# Patient Record
Sex: FEMALE | Race: BLACK OR AFRICAN AMERICAN | Employment: FULL TIME | URBAN - METROPOLITAN AREA
[De-identification: names, ages, dates, MRNs, and addresses within clinical notes are randomized per-mention and may not be internally consistent; named-entity substitution may affect disease eponyms.]

---

## 2017-01-02 ENCOUNTER — APPOINTMENT (OUTPATIENT)
Dept: CT IMAGING | Age: 22
End: 2017-01-02
Attending: EMERGENCY MEDICINE
Payer: COMMERCIAL

## 2017-01-02 ENCOUNTER — HOSPITAL ENCOUNTER (EMERGENCY)
Age: 22
Discharge: HOME | End: 2017-01-03
Attending: EMERGENCY MEDICINE
Payer: COMMERCIAL

## 2017-01-02 DIAGNOSIS — M79.602 LEFT ARM PAIN: Primary | ICD-10-CM

## 2017-01-02 LAB
ANION GAP BLD CALC-SCNC: 6 MMOL/L (ref 3–18)
BASOPHILS # BLD AUTO: 0.1 K/UL (ref 0–0.1)
BASOPHILS # BLD: 1 % (ref 0–2)
BUN SERPL-MCNC: 9 MG/DL (ref 7–18)
BUN/CREAT SERPL: 11 (ref 12–20)
CALCIUM SERPL-MCNC: 8.9 MG/DL (ref 8.5–10.1)
CHLORIDE SERPL-SCNC: 104 MMOL/L (ref 100–108)
CK MB CFR SERPL CALC: 0.6 % (ref 0–4)
CK MB SERPL-MCNC: 0.7 NG/ML (ref 0.5–3.6)
CK SERPL-CCNC: 108 U/L (ref 26–192)
CO2 SERPL-SCNC: 28 MMOL/L (ref 21–32)
CREAT SERPL-MCNC: 0.83 MG/DL (ref 0.6–1.3)
DIFFERENTIAL METHOD BLD: ABNORMAL
EOSINOPHIL # BLD: 0.5 K/UL (ref 0–0.4)
EOSINOPHIL NFR BLD: 6 % (ref 0–5)
ERYTHROCYTE [DISTWIDTH] IN BLOOD BY AUTOMATED COUNT: 14 % (ref 11.6–14.5)
GLUCOSE SERPL-MCNC: 84 MG/DL (ref 74–99)
HCT VFR BLD AUTO: 37.5 % (ref 35–45)
HGB BLD-MCNC: 11.9 G/DL (ref 12–16)
LYMPHOCYTES # BLD AUTO: 35 % (ref 21–52)
LYMPHOCYTES # BLD: 2.8 K/UL (ref 0.9–3.6)
MCH RBC QN AUTO: 25.5 PG (ref 24–34)
MCHC RBC AUTO-ENTMCNC: 31.7 G/DL (ref 31–37)
MCV RBC AUTO: 80.3 FL (ref 74–97)
MONOCYTES # BLD: 0.6 K/UL (ref 0.05–1.2)
MONOCYTES NFR BLD AUTO: 8 % (ref 3–10)
NEUTS SEG # BLD: 4.2 K/UL (ref 1.8–8)
NEUTS SEG NFR BLD AUTO: 50 % (ref 40–73)
PLATELET # BLD AUTO: 315 K/UL (ref 135–420)
PMV BLD AUTO: 9.6 FL (ref 9.2–11.8)
POTASSIUM SERPL-SCNC: 3.7 MMOL/L (ref 3.5–5.5)
RBC # BLD AUTO: 4.67 M/UL (ref 4.2–5.3)
SODIUM SERPL-SCNC: 138 MMOL/L (ref 136–145)
TROPONIN I BLD-MCNC: <0.04 NG/ML (ref 0–0.08)
TROPONIN I SERPL-MCNC: <0.02 NG/ML (ref 0–0.04)
WBC # BLD AUTO: 8.2 K/UL (ref 4.6–13.2)

## 2017-01-02 PROCEDURE — 70450 CT HEAD/BRAIN W/O DYE: CPT

## 2017-01-02 PROCEDURE — 84484 ASSAY OF TROPONIN QUANT: CPT | Performed by: EMERGENCY MEDICINE

## 2017-01-02 PROCEDURE — 99285 EMERGENCY DEPT VISIT HI MDM: CPT

## 2017-01-02 PROCEDURE — 93005 ELECTROCARDIOGRAM TRACING: CPT

## 2017-01-02 PROCEDURE — 85025 COMPLETE CBC W/AUTO DIFF WBC: CPT | Performed by: EMERGENCY MEDICINE

## 2017-01-02 PROCEDURE — 80048 BASIC METABOLIC PNL TOTAL CA: CPT | Performed by: EMERGENCY MEDICINE

## 2017-01-03 VITALS
TEMPERATURE: 98 F | SYSTOLIC BLOOD PRESSURE: 108 MMHG | DIASTOLIC BLOOD PRESSURE: 66 MMHG | HEART RATE: 68 BPM | RESPIRATION RATE: 16 BRPM | OXYGEN SATURATION: 99 %

## 2017-01-03 LAB
ATRIAL RATE: 65 BPM
ATRIAL RATE: 68 BPM
CALCULATED P AXIS, ECG09: -2 DEGREES
CALCULATED P AXIS, ECG09: 12 DEGREES
CALCULATED R AXIS, ECG10: 29 DEGREES
CALCULATED R AXIS, ECG10: 42 DEGREES
CALCULATED T AXIS, ECG11: 24 DEGREES
CALCULATED T AXIS, ECG11: 35 DEGREES
DIAGNOSIS, 93000: NORMAL
DIAGNOSIS, 93000: NORMAL
P-R INTERVAL, ECG05: 144 MS
P-R INTERVAL, ECG05: 152 MS
Q-T INTERVAL, ECG07: 398 MS
Q-T INTERVAL, ECG07: 410 MS
QRS DURATION, ECG06: 80 MS
QRS DURATION, ECG06: 80 MS
QTC CALCULATION (BEZET), ECG08: 423 MS
QTC CALCULATION (BEZET), ECG08: 426 MS
VENTRICULAR RATE, ECG03: 65 BPM
VENTRICULAR RATE, ECG03: 68 BPM

## 2017-01-03 NOTE — ED PROVIDER NOTES
HPI Comments: 7:37 PM female 22 y.o. with a history of migranes presenting to the ED complaining of lower left sided chest pain x 1 hour. She states the pain intially began as numbness in her left index finger and thumb at 1530. She states that the pain did not subside and radiated ot her left elbow, shoulder, lower left chest and back. She further reports light headedness. She notes that her MP onset yesterday and was unusually heavy, notes her LMP was also unusually heavy. She has not been evaluated by her OB/GYN since menorrhagia onset. Also notes she is currently on birth control but no other medications. She is a  at a beauty supply store x 1 year. Pt denies the use of tobacco, EtOH or illicit drugs. Denies any known allergies. She also denies HA, NVD. No other associated symptoms or other concerns at this time. PCP: Carmine Soliman MD      Patient is a 22 y.o. female presenting with arm pain and chest pain. The history is provided by the patient. Arm Pain    Associated symptoms include back pain. Pertinent negatives include no numbness and no neck pain. Chest Pain (Angina)    Associated symptoms include back pain. Pertinent negatives include no abdominal pain, no cough, no dizziness, no headaches, no nausea, no numbness, no shortness of breath, no vomiting and no weakness. Past Medical History:   Diagnosis Date    Migraines        History reviewed. No pertinent past surgical history. History reviewed. No pertinent family history. Social History     Social History    Marital status: SINGLE     Spouse name: N/A    Number of children: N/A    Years of education: N/A     Occupational History    Not on file.      Social History Main Topics    Smoking status: Never Smoker    Smokeless tobacco: Not on file    Alcohol use No    Drug use: Not on file    Sexual activity: Not on file     Other Topics Concern    Not on file     Social History Narrative         ALLERGIES: Mushroom combination no.1    Review of Systems   Constitutional: Negative. Negative for activity change and appetite change. HENT: Negative for congestion, ear discharge, ear pain, facial swelling, nosebleeds, postnasal drip, sinus pressure, sneezing and tinnitus. Eyes: Negative for pain, discharge, redness and visual disturbance. Respiratory: Negative for apnea, cough, choking, chest tightness, shortness of breath, wheezing and stridor. Cardiovascular: Positive for chest pain. Negative for leg swelling. Gastrointestinal: Negative for abdominal distention, abdominal pain, anal bleeding, blood in stool, constipation, diarrhea, nausea and vomiting. Genitourinary: Positive for menstrual problem. Negative for decreased urine volume, difficulty urinating, dyspareunia, dysuria, flank pain, frequency, hematuria, pelvic pain, urgency, vaginal bleeding and vaginal discharge. Musculoskeletal: Positive for back pain and myalgias. Negative for arthralgias, gait problem, joint swelling, neck pain and neck stiffness. Skin: Negative for color change. Neurological: Positive for light-headedness. Negative for dizziness, tremors, seizures, speech difficulty, weakness, numbness and headaches. Hematological: Negative for adenopathy. Does not bruise/bleed easily. Psychiatric/Behavioral: Negative for agitation, dysphoric mood and self-injury. The patient is not nervous/anxious. Vitals:    01/02/17 2145 01/02/17 2215 01/02/17 2230 01/02/17 2300   BP: 102/62 100/56 104/58 102/62   Pulse: 71 70 69 69   Resp: 12 19 15 12   Temp:       SpO2: 100% 100% 100% 100%            Physical Exam   Constitutional: She is oriented to person, place, and time. She appears well-developed and well-nourished. HENT:   Head: Normocephalic and atraumatic.    Right Ear: External ear normal.   Left Ear: External ear normal.   Nose: Nose normal.   Mouth/Throat: Oropharynx is clear and moist.   Eyes: Conjunctivae and EOM are normal. Pupils are equal, round, and reactive to light. Neck: Normal range of motion. Neck supple. Cardiovascular: Regular rhythm, normal heart sounds and intact distal pulses. Pulmonary/Chest: Effort normal and breath sounds normal. No respiratory distress. She has no wheezes. She has no rales. She exhibits no tenderness. Abdominal: Soft. Bowel sounds are normal. She exhibits no distension and no mass. There is no tenderness. There is no rebound and no guarding. Musculoskeletal: Normal range of motion. She exhibits no edema. Neurological: She is alert and oriented to person, place, and time. Skin: Skin is warm and dry. No rash noted. No erythema. Psychiatric: She has a normal mood and affect. Her behavior is normal. Judgment normal.   Nursing note and vitals reviewed. MDM  Number of Diagnoses or Management Options  Left arm pain:   Diagnosis management comments: Patient with numbness of the left index finger and thumb The differential diagnosis includes but is not limited to neuropathy, cervical injury, cardiac disease, IC lesion, other.          Amount and/or Complexity of Data Reviewed  Clinical lab tests: ordered and reviewed  Tests in the radiology section of CPT®: ordered and reviewed    Risk of Complications, Morbidity, and/or Mortality  Presenting problems: moderate    Patient Progress  Patient progress: improved    ED Course       Procedures      Vitals:  Patient Vitals for the past 12 hrs:   Temp Pulse Resp BP SpO2   01/02/17 2300 - 69 12 102/62 100 %   01/02/17 2230 - 69 15 104/58 100 %   01/02/17 2215 - 70 19 100/56 100 %   01/02/17 2145 - 71 12 102/62 100 %   01/02/17 2115 - - - 95/59 100 %   01/02/17 1925 97.9 °F (36.6 °C) 69 16 119/77 100 %         Medications ordered:   Medications - No data to display      Lab findings:  Recent Results (from the past 12 hour(s))   EKG, 12 LEAD, INITIAL    Collection Time: 01/02/17  7:16 PM   Result Value Ref Range    Ventricular Rate 68 BPM    Atrial Rate 68 BPM    P-R Interval 152 ms    QRS Duration 80 ms    Q-T Interval 398 ms    QTC Calculation (Bezet) 423 ms    Calculated P Axis 12 degrees    Calculated R Axis 29 degrees    Calculated T Axis 24 degrees    Diagnosis       Normal sinus rhythm  Normal ECG  No previous ECGs available     CBC WITH AUTOMATED DIFF    Collection Time: 01/02/17  8:00 PM   Result Value Ref Range    WBC 8.2 4.6 - 13.2 K/uL    RBC 4.67 4.20 - 5.30 M/uL    HGB 11.9 (L) 12.0 - 16.0 g/dL    HCT 37.5 35.0 - 45.0 %    MCV 80.3 74.0 - 97.0 FL    MCH 25.5 24.0 - 34.0 PG    MCHC 31.7 31.0 - 37.0 g/dL    RDW 14.0 11.6 - 14.5 %    PLATELET 860 864 - 285 K/uL    MPV 9.6 9.2 - 11.8 FL    NEUTROPHILS 50 40 - 73 %    LYMPHOCYTES 35 21 - 52 %    MONOCYTES 8 3 - 10 %    EOSINOPHILS 6 (H) 0 - 5 %    BASOPHILS 1 0 - 2 %    ABS. NEUTROPHILS 4.2 1.8 - 8.0 K/UL    ABS. LYMPHOCYTES 2.8 0.9 - 3.6 K/UL    ABS. MONOCYTES 0.6 0.05 - 1.2 K/UL    ABS. EOSINOPHILS 0.5 (H) 0.0 - 0.4 K/UL    ABS.  BASOPHILS 0.1 0.0 - 0.1 K/UL    DF AUTOMATED     METABOLIC PANEL, BASIC    Collection Time: 01/02/17  8:00 PM   Result Value Ref Range    Sodium 138 136 - 145 mmol/L    Potassium 3.7 3.5 - 5.5 mmol/L    Chloride 104 100 - 108 mmol/L    CO2 28 21 - 32 mmol/L    Anion gap 6 3.0 - 18 mmol/L    Glucose 84 74 - 99 mg/dL    BUN 9 7.0 - 18 MG/DL    Creatinine 0.83 0.6 - 1.3 MG/DL    BUN/Creatinine ratio 11 (L) 12 - 20      GFR est AA >60 >60 ml/min/1.73m2    GFR est non-AA >60 >60 ml/min/1.73m2    Calcium 8.9 8.5 - 10.1 MG/DL   CARDIAC PANEL,(CK, CKMB & TROPONIN)    Collection Time: 01/02/17  8:00 PM   Result Value Ref Range     26 - 192 U/L    CK - MB 0.7 0.5 - 3.6 ng/ml    CK-MB Index 0.6 0.0 - 4.0 %    Troponin-I, Qt. <0.02 0.0 - 0.045 NG/ML   EKG, 12 LEAD, SUBSEQUENT    Collection Time: 01/02/17  9:13 PM   Result Value Ref Range    Ventricular Rate 65 BPM    Atrial Rate 65 BPM    P-R Interval 144 ms    QRS Duration 80 ms    Q-T Interval 410 ms    QTC Calculation (Bezet) 426 ms    Calculated P Axis -2 degrees    Calculated R Axis 42 degrees    Calculated T Axis 35 degrees    Diagnosis       Normal sinus rhythm  Normal ECG  When compared with ECG of 02-JAN-2017 19:16,  No significant change was found     POC TROPONIN-I    Collection Time: 01/02/17 11:25 PM   Result Value Ref Range    Troponin-I (POC) <0.04 0.00 - 0.08 ng/mL         X-Ray, CT or other radiology findings or impressions:  CT HEAD WO CONT   Final Result   IMPRESSION:     No acute intracranial pathology. The brain looks normal.       Progress notes, Consult notes or additional Procedure notes:     11:42 PM: Patient labs and radiology is normal. Suspect that this is a pinched nerve vs demyelinated lesion. Reassessed patient and patient is feeling well. Discussed their results and diagnosis. Patient will be discharged in stable condition. I suggested to the patient to receive follow up studies to determine etiology. Patient is to return to the emergency department for any new or worsening conditions. Patient understands and agrees with discharge plan. Disposition:  Diagnosis:   1. Left arm pain        Disposition: Discharged    Follow-up Information     None           Patient's Medications   Start Taking    No medications on file   Continue Taking    ACETAMINOPHEN (TYLENOL PO)    Take  by mouth. IBUPROFEN (MOTRIN PO)    Take  by mouth. PROMETHAZINE (PHENERGAN) 25 MG TABLET    Take 1 Tab by mouth every six (6) hours as needed. RIZATRIPTAN (MAXALT-MLT) 10 MG DISINTEGRATING TABLET    Take 10 mg by mouth once as needed for Migraine. These Medications have changed    No medications on file   Stop Taking    No medications on file         Scribe [address], [place] [address] for and in the presence of Jacklyn Elias MD  01/02/17.     Signed by: Carmel Canavan, Scribe 01/02/17, 8:01 PM    Physician Attestation  I personally performed the services described in the documentation, reviewed the documentation, as recorded by the scribe in my presence, and it accurately and completely records my words and actions.     Yary Calvo MD 01/02/17

## 2017-01-03 NOTE — ED NOTES
Patient with numbness of the left index finger and thumb The differential diagnosis includes but is not limited to neuropathy, cervical injury, cardiac disease, IC lesion, other.

## 2017-01-03 NOTE — ED TRIAGE NOTES
\"about 3 hrs ago my left fingers stated tingling and it gradually when to my left arm, left should, and now my chest.\"

## 2017-03-31 ENCOUNTER — HOSPITAL ENCOUNTER (EMERGENCY)
Age: 22
Discharge: HOME | End: 2017-03-31
Attending: EMERGENCY MEDICINE
Payer: COMMERCIAL

## 2017-03-31 VITALS
RESPIRATION RATE: 18 BRPM | SYSTOLIC BLOOD PRESSURE: 100 MMHG | DIASTOLIC BLOOD PRESSURE: 67 MMHG | BODY MASS INDEX: 34.02 KG/M2 | OXYGEN SATURATION: 98 % | HEIGHT: 63 IN | HEART RATE: 80 BPM | TEMPERATURE: 97.1 F | WEIGHT: 192 LBS

## 2017-03-31 DIAGNOSIS — R51.9 HEADACHE, UNSPECIFIED HEADACHE TYPE: ICD-10-CM

## 2017-03-31 DIAGNOSIS — R11.0 NAUSEA WITHOUT VOMITING: ICD-10-CM

## 2017-03-31 DIAGNOSIS — R42 DIZZINESS: Primary | ICD-10-CM

## 2017-03-31 DIAGNOSIS — R10.13 ABDOMINAL PAIN, EPIGASTRIC: ICD-10-CM

## 2017-03-31 DIAGNOSIS — Z3A.13 13 WEEKS GESTATION OF PREGNANCY: ICD-10-CM

## 2017-03-31 DIAGNOSIS — D64.9 NORMOCYTIC ANEMIA: ICD-10-CM

## 2017-03-31 LAB
ALBUMIN SERPL BCP-MCNC: 3.2 G/DL (ref 3.4–5)
ALBUMIN/GLOB SERPL: 0.7 {RATIO} (ref 0.8–1.7)
ALP SERPL-CCNC: 58 U/L (ref 45–117)
ALT SERPL-CCNC: 12 U/L (ref 13–56)
ANION GAP BLD CALC-SCNC: 8 MMOL/L (ref 3–18)
APPEARANCE UR: CLEAR
AST SERPL W P-5'-P-CCNC: 24 U/L (ref 15–37)
BASOPHILS # BLD AUTO: 0 K/UL (ref 0–0.1)
BASOPHILS # BLD: 0 % (ref 0–2)
BILIRUB DIRECT SERPL-MCNC: <0.1 MG/DL (ref 0–0.2)
BILIRUB SERPL-MCNC: 0.1 MG/DL (ref 0.2–1)
BILIRUB UR QL: NEGATIVE
BUN SERPL-MCNC: 8 MG/DL (ref 7–18)
BUN/CREAT SERPL: 13 (ref 12–20)
CALCIUM SERPL-MCNC: 8.5 MG/DL (ref 8.5–10.1)
CHLORIDE SERPL-SCNC: 102 MMOL/L (ref 100–108)
CK MB CFR SERPL CALC: NORMAL % (ref 0–4)
CK MB SERPL-MCNC: <1 NG/ML (ref 5–25)
CK SERPL-CCNC: 92 U/L (ref 26–192)
CO2 SERPL-SCNC: 25 MMOL/L (ref 21–32)
COLOR UR: YELLOW
CREAT SERPL-MCNC: 0.62 MG/DL (ref 0.6–1.3)
DIFFERENTIAL METHOD BLD: ABNORMAL
EOSINOPHIL # BLD: 0.2 K/UL (ref 0–0.4)
EOSINOPHIL NFR BLD: 3 % (ref 0–5)
ERYTHROCYTE [DISTWIDTH] IN BLOOD BY AUTOMATED COUNT: 14.1 % (ref 11.6–14.5)
GLOBULIN SER CALC-MCNC: 4.5 G/DL (ref 2–4)
GLUCOSE SERPL-MCNC: 81 MG/DL (ref 74–99)
GLUCOSE UR STRIP.AUTO-MCNC: NEGATIVE MG/DL
HCG SERPL-ACNC: ABNORMAL MIU/ML (ref 0–10)
HCT VFR BLD AUTO: 33.8 % (ref 35–45)
HGB BLD-MCNC: 11.1 G/DL (ref 12–16)
HGB UR QL STRIP: NEGATIVE
KETONES UR QL STRIP.AUTO: NEGATIVE MG/DL
LEUKOCYTE ESTERASE UR QL STRIP.AUTO: NEGATIVE
LYMPHOCYTES # BLD AUTO: 23 % (ref 21–52)
LYMPHOCYTES # BLD: 2 K/UL (ref 0.9–3.6)
MAGNESIUM SERPL-MCNC: 2.1 MG/DL (ref 1.8–2.4)
MCH RBC QN AUTO: 26.3 PG (ref 24–34)
MCHC RBC AUTO-ENTMCNC: 32.8 G/DL (ref 31–37)
MCV RBC AUTO: 80.1 FL (ref 74–97)
MONOCYTES # BLD: 0.9 K/UL (ref 0.05–1.2)
MONOCYTES NFR BLD AUTO: 10 % (ref 3–10)
NEUTS SEG # BLD: 5.5 K/UL (ref 1.8–8)
NEUTS SEG NFR BLD AUTO: 64 % (ref 40–73)
NITRITE UR QL STRIP.AUTO: NEGATIVE
PH UR STRIP: 7 [PH] (ref 5–8)
PLATELET # BLD AUTO: 273 K/UL (ref 135–420)
PMV BLD AUTO: 10 FL (ref 9.2–11.8)
POTASSIUM SERPL-SCNC: 4.2 MMOL/L (ref 3.5–5.5)
PROT SERPL-MCNC: 7.7 G/DL (ref 6.4–8.2)
PROT UR STRIP-MCNC: NEGATIVE MG/DL
RBC # BLD AUTO: 4.22 M/UL (ref 4.2–5.3)
SODIUM SERPL-SCNC: 135 MMOL/L (ref 136–145)
SP GR UR REFRACTOMETRY: 1.01 (ref 1–1.03)
TROPONIN I SERPL-MCNC: <0.02 NG/ML (ref 0–0.04)
UROBILINOGEN UR QL STRIP.AUTO: 0.2 EU/DL (ref 0.2–1)
WBC # BLD AUTO: 8.7 K/UL (ref 4.6–13.2)

## 2017-03-31 PROCEDURE — 93005 ELECTROCARDIOGRAM TRACING: CPT

## 2017-03-31 PROCEDURE — 83735 ASSAY OF MAGNESIUM: CPT | Performed by: PHYSICIAN ASSISTANT

## 2017-03-31 PROCEDURE — 80076 HEPATIC FUNCTION PANEL: CPT | Performed by: PHYSICIAN ASSISTANT

## 2017-03-31 PROCEDURE — 84702 CHORIONIC GONADOTROPIN TEST: CPT | Performed by: PHYSICIAN ASSISTANT

## 2017-03-31 PROCEDURE — 82550 ASSAY OF CK (CPK): CPT | Performed by: PHYSICIAN ASSISTANT

## 2017-03-31 PROCEDURE — 96374 THER/PROPH/DIAG INJ IV PUSH: CPT

## 2017-03-31 PROCEDURE — 74011250637 HC RX REV CODE- 250/637: Performed by: PHYSICIAN ASSISTANT

## 2017-03-31 PROCEDURE — 81003 URINALYSIS AUTO W/O SCOPE: CPT

## 2017-03-31 PROCEDURE — 74011250636 HC RX REV CODE- 250/636: Performed by: PHYSICIAN ASSISTANT

## 2017-03-31 PROCEDURE — 85025 COMPLETE CBC W/AUTO DIFF WBC: CPT | Performed by: PHYSICIAN ASSISTANT

## 2017-03-31 PROCEDURE — 80048 BASIC METABOLIC PNL TOTAL CA: CPT | Performed by: PHYSICIAN ASSISTANT

## 2017-03-31 PROCEDURE — 99283 EMERGENCY DEPT VISIT LOW MDM: CPT

## 2017-03-31 PROCEDURE — 96361 HYDRATE IV INFUSION ADD-ON: CPT

## 2017-03-31 RX ORDER — SODIUM CHLORIDE 9 MG/ML
1000 INJECTION, SOLUTION INTRAVENOUS ONCE
Status: COMPLETED | OUTPATIENT
Start: 2017-03-31 | End: 2017-03-31

## 2017-03-31 RX ORDER — METOCLOPRAMIDE HYDROCHLORIDE 5 MG/ML
5 INJECTION INTRAMUSCULAR; INTRAVENOUS
Status: COMPLETED | OUTPATIENT
Start: 2017-03-31 | End: 2017-03-31

## 2017-03-31 RX ORDER — METOCLOPRAMIDE 10 MG/1
10 TABLET ORAL
Qty: 12 TAB | Refills: 0 | Status: SHIPPED | OUTPATIENT
Start: 2017-03-31 | End: 2017-04-10

## 2017-03-31 RX ORDER — ACETAMINOPHEN 500 MG
1000 TABLET ORAL
Status: COMPLETED | OUTPATIENT
Start: 2017-03-31 | End: 2017-03-31

## 2017-03-31 RX ADMIN — METOCLOPRAMIDE 5 MG: 5 INJECTION, SOLUTION INTRAMUSCULAR; INTRAVENOUS at 17:39

## 2017-03-31 RX ADMIN — ACETAMINOPHEN 1000 MG: 500 TABLET ORAL at 16:42

## 2017-03-31 RX ADMIN — SODIUM CHLORIDE 1000 ML: 900 INJECTION, SOLUTION INTRAVENOUS at 16:42

## 2017-03-31 NOTE — ED NOTES
Patient armband removed and shredded  I have reviewed discharge instructions with the patient. The patient verbalized understanding. Patient discharged on two prescriptions.

## 2017-03-31 NOTE — ED NOTES
I performed a brief evaluation, including history and physical, of the patient here in triage and I have determined that pt will need further treatment and evaluation from the main side ER physician. I have placed initial orders to help in expediting patients care.      March 31, 2017 at 4:25 PM - Jennifer Dover PA-C        Visit Vitals    /75 (BP 1 Location: Left arm, BP Patient Position: At rest)    Pulse 84    Temp 98 °F (36.7 °C)    Resp 14    Ht 5' 3\" (1.6 m)    Wt 87.1 kg (192 lb)    SpO2 99%    BMI 34.01 kg/m2

## 2017-03-31 NOTE — ED TRIAGE NOTES
States that she has been feeling dizzy off and on all day. Nausea but no vomiting. Is 13 weeks pregnant, headache and abdominal cramps off and on all day.

## 2017-03-31 NOTE — ED PROVIDER NOTES
HPI Comments: 4:26 PM. Yvonne Gunter is a 25 y.o. female with a history of migraines who presents to the ED c/o lightheadedness and dizziness, which started approximately 3 hours ago. She notes that she is 13 weeks pregnant and has been eating and drinking normally. She also reports nausea and headache and mentions that she experienced cramping earlier. She denies vomiting and vaginal bleeding. There are no other concerns at this time. Patient is a 25 y.o. female presenting with dizziness, headaches, nausea, and abdominal pain. The history is provided by the patient. Dizziness    Associated symptoms include abdominal pain, nausea, headaches, dizziness and light-headedness. Pertinent negatives include no chest pain, no palpitations, no diaphoresis, no fever, no vomiting, no congestion, no back pain, no seizures and no weakness. Her past medical history does not include no syncope. Headache   Associated symptoms include abdominal pain and headaches. Pertinent negatives include no chest pain and no shortness of breath. Nausea    Associated symptoms include abdominal pain, headaches and headaches. Pertinent negatives include no chills, no fever, no diarrhea, no myalgias and no cough. Abdominal Pain    Associated symptoms include nausea and headaches. Pertinent negatives include no fever, no diarrhea, no vomiting, no constipation, no dysuria, no frequency, no hematuria, no myalgias, no chest pain and no back pain. Past Medical History:   Diagnosis Date    Migraines     Migraines        History reviewed. No pertinent surgical history. History reviewed. No pertinent family history. Social History     Social History    Marital status: SINGLE     Spouse name: N/A    Number of children: N/A    Years of education: N/A     Occupational History    Not on file.      Social History Main Topics    Smoking status: Never Smoker    Smokeless tobacco: Not on file    Alcohol use No    Drug use: Not on file    Sexual activity: Not on file     Other Topics Concern    Not on file     Social History Narrative         ALLERGIES: Mushroom combination no.1    Review of Systems   Constitutional: Negative for chills, diaphoresis, fatigue and fever. HENT: Negative for congestion, ear pain, rhinorrhea and sore throat. Eyes: Negative for pain and redness. Respiratory: Negative for cough, shortness of breath, wheezing and stridor. Cardiovascular: Negative for chest pain, palpitations and leg swelling. Gastrointestinal: Positive for abdominal pain and nausea. Negative for constipation, diarrhea and vomiting. Genitourinary: Negative for difficulty urinating, dysuria, flank pain, frequency, hematuria, pelvic pain, urgency, vaginal bleeding and vaginal discharge. Musculoskeletal: Negative for back pain, myalgias, neck pain and neck stiffness. Skin: Negative for rash and wound. Neurological: Positive for dizziness, light-headedness and headaches. Negative for seizures, syncope, weakness and numbness. All other systems reviewed and are negative. Vitals:    03/31/17 1624 03/31/17 1826   BP: 109/75 100/67   Pulse: 84 80   Resp: 14 18   Temp: 98 °F (36.7 °C) 97.1 °F (36.2 °C)   SpO2: 99% 98%   Weight: 87.1 kg (192 lb)    Height: 5' 3\" (1.6 m)             Physical Exam   Constitutional: She is oriented to person, place, and time. She appears well-developed and well-nourished. No distress. HENT:   Head: Normocephalic. Eyes: Conjunctivae and EOM are normal. Pupils are equal, round, and reactive to light. Right eye exhibits no discharge. Left eye exhibits no discharge. No scleral icterus. Neck: Normal range of motion. Neck supple. Cardiovascular: Normal rate, regular rhythm and normal heart sounds. Exam reveals no gallop and no friction rub. No murmur heard. Pulmonary/Chest: Effort normal and breath sounds normal. No stridor. No respiratory distress. She has no wheezes. She has no rales. Abdominal: Soft. Bowel sounds are normal. She exhibits no distension and no mass. There is no tenderness. There is no rebound and no guarding. No abdominal tenderness noted on exam   Musculoskeletal: Normal range of motion. Neurological: She is alert and oriented to person, place, and time. No cranial nerve deficit. She exhibits normal muscle tone. Coordination normal.   Gait is steady. Able to ambulate without difficulty. No facl deficits on exam.    Skin: Skin is warm, dry and intact. No rash noted. She is not diaphoretic. No cyanosis or erythema. No pallor. Psychiatric: She has a normal mood and affect. Her behavior is normal. Thought content normal.   Nursing note and vitals reviewed. MDM  Number of Diagnoses or Management Options  13 weeks gestation of pregnancy: new and requires workup  Abdominal pain, epigastric: new and requires workup  Dizziness: new and requires workup  Headache, unspecified headache type: new and requires workup  Nausea without vomiting: new and requires workup  Normocytic anemia: new and requires workup  Diagnosis management comments: Impression:  Dizziness, lightheadedness    IV inserted 1 L saline given 1 g tylenol and 5 mg reglan given     hgb 11.1, hct 33.8,   UA unremarkable,   EKG: normal sinus rhythm, rate 77, no STEMI     Pt is turned over to untapt who agrees to assume care of this pt at this time. Discussed this case with the PA who agrees with the plan to dispo the pt pending results of labs/imaging. Lidia Crum PA-C 6:00 PM    Discussed with WILLOW Crum concerning patient Gloria Hinton, standard discussion of reason for visit, HPI, ROS, PE, and current results available. She reports no abdominal pain on her exam, no pelvic or U/S performed. Pt had U/S on 3/8/17 by OB. Recommendation for awaiting labs, FHT. I agree to assume the care of this patient at this time as the previous provider's shift has come to a close.  I have introduced myself to the patient, explained that I was the provider who would be following the remaining course of their stay in the Emergency Department. I subsequently reaffirmed the history by the preceding provider, I have interviewed, and reexamined the patient. Juan Delacruz  March 31, 2017  6:00 PM     FHT Reassuring 150's per MEGHAN Solomon. Patient reports resolution of all symptoms. Feels so much better. [facility] 12/2016. Reviewed workup results, any meds, and discharge instructions OR admission plan with patient and any family present. Answered all questions. WILLOW Delacruz  6:33 PM    PLEASE FOLLOW-UP AS DIRECTED WITHOUT FAIL WITHIN THE TIME FRAME RECOMMENDED AS FAILURE TO DO SO COULD RESULT IN WORSENING OF YOUR PHYSICAL CONDITION, DEATH, AND OR PERMANENT DISABILITY. RETURN TO THE EMERGENCY DEPARTMENT AT IF YOU ARE UNABLE TO FOLLOW-UP AS DIRECTED. RETURN TO THE EMERGENCY DEPARTMENT AT ONCE IF YOU HAVE SYMPTOMS THAT DO NOT IMPROVE WITH TREATMENT, NEW SYMPTOMS, WORSENING SYMPTOMS, OR ANY OTHER CONCERNS. THE PATIENT AGREES WITH THE DISCHARGE PLAN AND FOLLOW-UP INSTRUCTIONS. THE PATIENT AGREES TO REVIEW ALL HANDOUTS. Amount and/or Complexity of Data Reviewed  Clinical lab tests: reviewed and ordered  Tests in the medicine section of CPT®: reviewed and ordered  Decide to obtain previous medical records or to obtain history from someone other than the patient: yes  Obtain history from someone other than the patient: yes (Mother, Boyfriend)  Review and summarize past medical records: yes  Discuss the patient with other providers: yes  Independent visualization of images, tracings, or specimens: yes    Risk of Complications, Morbidity, and/or Mortality  Presenting problems: moderate  Diagnostic procedures: moderate  Management options: moderate    Patient Progress  Patient progress: stable      Procedures  Diagnosis:   1. Dizziness    2. Abdominal pain, epigastric    3. Nausea without vomiting    4. Headache, unspecified headache type    5. Normocytic anemia    6. 13 weeks gestation of pregnancy          Disposition: HOME    Follow-up Information     Follow up With Details Comments Contact Info    [facility]  As needed, If symptoms worsen [address] [place] [address] [address] [facility]    Betina Pleitez MD Call in 3 days Schedule appointment to be seen same day for evaluation, review all labs without fail. [address] [place] [address]            Patient's Medications   Start Taking    METOCLOPRAMIDE HCL (REGLAN) 10 MG TABLET    Take 1 Tab by mouth every six (6) hours as needed for Nausea for up to 10 days. PRENATAL MULTIVIT-CA-MIN-FE-FA (PRENATAL VITAMIN) TAB    Take 1 Tab by mouth daily. Indications: Pregnancy   Continue Taking    No medications on file   These Medications have changed    No medications on file   Stop Taking    ACETAMINOPHEN (TYLENOL PO)    Take  by mouth. IBUPROFEN (MOTRIN PO)    Take  by mouth. PROMETHAZINE (PHENERGAN) 25 MG TABLET    Take 1 Tab by mouth every six (6) hours as needed. RIZATRIPTAN (MAXALT-MLT) 10 MG DISINTEGRATING TABLET    Take 10 mg by mouth once as needed for Migraine.        Recent Results (from the past 24 hour(s))   EKG, 12 LEAD, INITIAL    Collection Time: 03/31/17  4:35 PM   Result Value Ref Range    Ventricular Rate 77 BPM    Atrial Rate 77 BPM    P-R Interval 156 ms    QRS Duration 74 ms    Q-T Interval 380 ms    QTC Calculation (Bezet) 430 ms    Calculated P Axis 41 degrees    Calculated R Axis 59 degrees    Calculated T Axis 47 degrees    Diagnosis       Normal sinus rhythm  Normal ECG  When compared with ECG of 02-JAN-2017 21:13,  No significant change was found     CBC WITH AUTOMATED DIFF    Collection Time: 03/31/17  4:36 PM   Result Value Ref Range    WBC 8.7 4.6 - 13.2 K/uL    RBC 4.22 4.20 - 5.30 M/uL    HGB 11.1 (L) 12.0 - 16.0 g/dL    HCT 33.8 (L) 35.0 - 45.0 %    MCV 80.1 74.0 - 97.0 FL    MCH 26.3 24.0 - 34.0 PG    MCHC 32.8 31.0 - 37.0 g/dL    RDW 14.1 11.6 - 14.5 %    PLATELET 094 152 - 749 K/uL    MPV 10.0 9.2 - 11.8 FL    NEUTROPHILS 64 40 - 73 %    LYMPHOCYTES 23 21 - 52 %    MONOCYTES 10 3 - 10 %    EOSINOPHILS 3 0 - 5 %    BASOPHILS 0 0 - 2 %    ABS. NEUTROPHILS 5.5 1.8 - 8.0 K/UL    ABS. LYMPHOCYTES 2.0 0.9 - 3.6 K/UL    ABS. MONOCYTES 0.9 0.05 - 1.2 K/UL    ABS. EOSINOPHILS 0.2 0.0 - 0.4 K/UL    ABS. BASOPHILS 0.0 0.0 - 0.1 K/UL    DF AUTOMATED     METABOLIC PANEL, BASIC    Collection Time: 03/31/17  4:36 PM   Result Value Ref Range    Sodium 135 (L) 136 - 145 mmol/L    Potassium 4.2 3.5 - 5.5 mmol/L    Chloride 102 100 - 108 mmol/L    CO2 25 21 - 32 mmol/L    Anion gap 8 3.0 - 18 mmol/L    Glucose 81 74 - 99 mg/dL    BUN 8 7.0 - 18 MG/DL    Creatinine 0.62 0.6 - 1.3 MG/DL    BUN/Creatinine ratio 13 12 - 20      GFR est AA >60 >60 ml/min/1.73m2    GFR est non-AA >60 >60 ml/min/1.73m2    Calcium 8.5 8.5 - 10.1 MG/DL   MAGNESIUM    Collection Time: 03/31/17  4:36 PM   Result Value Ref Range    Magnesium 2.1 1.8 - 2.4 mg/dL   TOTAL HCG, QT. Collection Time: 03/31/17  4:36 PM   Result Value [place] [address] (H) 0 - 10 MIU/ML   HEPATIC FUNCTION PANEL    Collection Time: 03/31/17  4:36 PM   Result Value Ref Range    Protein, total 7.7 6.4 - 8.2 g/dL    Albumin 3.2 (L) 3.4 - 5.0 g/dL    Globulin 4.5 (H) 2.0 - 4.0 g/dL    A-G Ratio 0.7 (L) 0.8 - 1.7      Bilirubin, total 0.1 (L) 0.2 - 1.0 MG/DL    Bilirubin, direct <0.1 0.0 - 0.2 MG/DL    Alk.  phosphatase 58 45 - 117 U/L    AST (SGOT) 24 15 - 37 U/L    ALT (SGPT) 12 (L) 13 - 56 U/L   CARDIAC PANEL,(CK, CKMB & TROPONIN)    Collection Time: 03/31/17  4:36 PM   Result Value Ref Range    CK 92 26 - 192 U/L    CK - MB <1.0 <3.6 ng/ml    CK-MB Index Cannot be calulated 0.0 - 4.0 %    Troponin-I, Qt. <0.02 0.0 - 0.045 NG/ML   URINALYSIS W/ RFLX MICROSCOPIC    Collection Time: 03/31/17  4:58 PM   Result Value Ref Range    Color YELLOW Appearance CLEAR      Specific gravity 1.008 1.005 - 1.030      pH (UA) 7.0 5.0 - 8.0      Protein NEGATIVE  NEG mg/dL    Glucose NEGATIVE  NEG mg/dL    Ketone NEGATIVE  NEG mg/dL    Bilirubin NEGATIVE  NEG      Blood NEGATIVE  NEG      Urobilinogen 0.2 0.2 - 1.0 EU/dL    Nitrites NEGATIVE  NEG      Leukocyte Esterase NEGATIVE  NEG

## 2017-03-31 NOTE — LETTER
[address] [place] [address] [address] [facility] EMERGENCY DEPT 
[address] [facility] [id number] [phone] Work/School Note Date: 3/31/2017 To Whom It May concern: 
 
Irina Mitchell was seen and treated today in the emergency room by the following provider(s): 
Attending Provider: Sai Orantes MD 
Physician Assistant: WILLOW Ortiz. Irina Mitchell may return to work in 3 days. No lifting/pushing/pulling until cleared by Physician.  
 
Sincerely, 
 
 
 
 
WILLOW Ortiz

## 2017-04-03 LAB
ATRIAL RATE: 77 BPM
CALCULATED P AXIS, ECG09: 41 DEGREES
CALCULATED R AXIS, ECG10: 59 DEGREES
CALCULATED T AXIS, ECG11: 47 DEGREES
DIAGNOSIS, 93000: NORMAL
P-R INTERVAL, ECG05: 156 MS
Q-T INTERVAL, ECG07: 380 MS
QRS DURATION, ECG06: 74 MS
QTC CALCULATION (BEZET), ECG08: 430 MS
VENTRICULAR RATE, ECG03: 77 BPM

## 2017-04-24 ENCOUNTER — HOSPITAL ENCOUNTER (EMERGENCY)
Age: 22
Discharge: HOME | End: 2017-04-24
Attending: EMERGENCY MEDICINE
Payer: COMMERCIAL

## 2017-04-24 VITALS
TEMPERATURE: 98.1 F | SYSTOLIC BLOOD PRESSURE: 108 MMHG | HEART RATE: 103 BPM | BODY MASS INDEX: 34.73 KG/M2 | HEIGHT: 63 IN | WEIGHT: 196 LBS | RESPIRATION RATE: 16 BRPM | DIASTOLIC BLOOD PRESSURE: 72 MMHG | OXYGEN SATURATION: 100 %

## 2017-04-24 DIAGNOSIS — Z3A.19 19 WEEKS GESTATION OF PREGNANCY: Primary | ICD-10-CM

## 2017-04-24 DIAGNOSIS — N93.9 VAGINAL BLEEDING: ICD-10-CM

## 2017-04-24 PROCEDURE — 99284 EMERGENCY DEPT VISIT MOD MDM: CPT

## 2017-04-24 NOTE — ED NOTES
PT ambulated to room F3 without difficulty, reports small amount of bright red blood when she went to the bathroom this AM, denies abdominal pain, c/o upper abdominal pressure, Ax4, safety intact, will continue to monitor

## 2017-04-24 NOTE — ED NOTES
PT discharged per provider order, educated PT on discharge instructions and follow up care, verbalized understanding and ambulated out of ED without difficulty

## 2017-04-24 NOTE — ED PROVIDER NOTES
Patient is a 25 y.o. female presenting with vaginal bleeding. The history is provided by the patient. Vaginal Bleeding        Pt is c/o vaginal bleeding that started @45min ago. Not bleeding through pad. Pt is 19 weeks primigravida. Denies pelvic pain, dysuria. Followed by [facility] for her OB care. Denies ETOH, tobacco, illicits. Past Medical History:   Diagnosis Date    Migraines     Migraines        No past surgical history on file. No family history on file. Social History     Social History    Marital status: SINGLE     Spouse name: N/A    Number of children: N/A    Years of education: N/A     Occupational History    Not on file. Social History Main Topics    Smoking status: Never Smoker    Smokeless tobacco: Not on file    Alcohol use No    Drug use: Not on file    Sexual activity: Not on file     Other Topics Concern    Not on file     Social History Narrative         ALLERGIES: Mushroom combination no.1    Review of Systems   Constitutional: Negative. HENT: Negative. Eyes: Negative. Respiratory: Negative. Cardiovascular: Negative. Gastrointestinal: Negative. Endocrine: Negative. Genitourinary: Positive for vaginal bleeding. Negative for pelvic pain. Musculoskeletal: Negative. Skin: Negative. Allergic/Immunologic: Negative. Neurological: Negative. Hematological: Negative. Psychiatric/Behavioral: Negative. All other systems reviewed and are negative. Vitals:    04/24/17 1451   BP: 108/72   Pulse: (!) 103   Resp: 16   Temp: 98.1 °F (36.7 °C)   SpO2: 100%   Weight: 88.9 kg (196 lb)   Height: 5' 3\" (1.6 m)            Physical Exam   Constitutional: She is oriented to person, place, and time. She appears well-developed. HENT:   Head: Normocephalic and atraumatic. Eyes: Pupils are equal, round, and reactive to light. Neck: No JVD present. No tracheal deviation present. No thyromegaly present.    Cardiovascular: Normal rate, regular rhythm and normal heart sounds. Exam reveals no gallop and no friction rub. No murmur heard. Pulmonary/Chest: Effort normal and breath sounds normal. No stridor. No respiratory distress. She has no wheezes. She has no rales. She exhibits no tenderness. Abdominal: Soft. She exhibits no distension and no mass. There is no tenderness. There is no rebound and no guarding. Musculoskeletal: She exhibits no edema or tenderness. Lymphadenopathy:     She has no cervical adenopathy. Neurological: She is alert and oriented to person, place, and time. Skin: Skin is warm and dry. No rash noted. No erythema. No pallor. Psychiatric: She has a normal mood and affect. Her behavior is normal. Thought content normal.   Nursing note and vitals reviewed. MDM  Number of Diagnoses or Management Options  19 weeks gestation of pregnancy:   Vaginal bleeding:   Diagnosis management comments: Jono Roy is 19 weeks pregnant w/scant bleeding s/p sexual intercourse. O+ from [facility] labs 2015. 's bpm.    ED Course       Pelvic Exam  Date/Time: 4/24/2017 3:14 PM  Performed by: PA  Procedure duration:  3 minutes. Type of exam performed: speculum. Vaginal exam:  bleeding. Post-procedure bleeding: scant. Cervical exam:  active bleeding from os and os closed. Specimen(s) collected:  none. Patient tolerance: Patient tolerated the procedure well with no immediate complications          7:35 PM  Diagnosis:   1. 19 weeks gestation of pregnancy    2.  Vaginal bleeding          Disposition: home    Follow-up Information     Follow up With Details Comments [id number] Lucy Gayle MD Schedule an appointment as soon as possible for a visit in 2 days  [facility] [address] [place] [facility]  If symptoms worsen return immediately [address] [address] [phone]          Patient's Medications   Start Taking    No medications on file   Continue Taking    PRENATAL MULTIVIT-CA-MIN-FE-FA (PRENATAL VITAMIN) TAB    Take 1 Tab by mouth daily.  Indications: Pregnancy   These Medications have changed    No medications on file   Stop Taking    No medications on file

## 2017-06-03 ENCOUNTER — HOSPITAL ENCOUNTER (EMERGENCY)
Age: 22
Discharge: HOME | End: 2017-06-03
Attending: EMERGENCY MEDICINE | Admitting: EMERGENCY MEDICINE
Payer: COMMERCIAL

## 2017-06-03 VITALS
DIASTOLIC BLOOD PRESSURE: 59 MMHG | WEIGHT: 202 LBS | OXYGEN SATURATION: 100 % | RESPIRATION RATE: 17 BRPM | SYSTOLIC BLOOD PRESSURE: 104 MMHG | HEIGHT: 63 IN | BODY MASS INDEX: 35.79 KG/M2 | HEART RATE: 82 BPM | TEMPERATURE: 98.1 F

## 2017-06-03 DIAGNOSIS — Z34.90 PREGNANCY, UNSPECIFIED GESTATIONAL AGE: ICD-10-CM

## 2017-06-03 DIAGNOSIS — R07.9 ACUTE CHEST PAIN: Primary | ICD-10-CM

## 2017-06-03 LAB
ALBUMIN SERPL BCP-MCNC: 2.9 G/DL (ref 3.4–5)
ALBUMIN/GLOB SERPL: 0.7 {RATIO} (ref 0.8–1.7)
ALP SERPL-CCNC: 69 U/L (ref 45–117)
ALT SERPL-CCNC: 14 U/L (ref 13–56)
ANION GAP BLD CALC-SCNC: 9 MMOL/L (ref 3–18)
AST SERPL W P-5'-P-CCNC: 15 U/L (ref 15–37)
ATRIAL RATE: 80 BPM
BASOPHILS # BLD AUTO: 0 K/UL (ref 0–0.1)
BASOPHILS # BLD: 0 % (ref 0–2)
BILIRUB SERPL-MCNC: 0.1 MG/DL (ref 0.2–1)
BUN SERPL-MCNC: 12 MG/DL (ref 7–18)
BUN/CREAT SERPL: 19 (ref 12–20)
CALCIUM SERPL-MCNC: 8.8 MG/DL (ref 8.5–10.1)
CALCULATED P AXIS, ECG09: 65 DEGREES
CALCULATED R AXIS, ECG10: 61 DEGREES
CALCULATED T AXIS, ECG11: 36 DEGREES
CHLORIDE SERPL-SCNC: 108 MMOL/L (ref 100–108)
CK MB CFR SERPL CALC: 1.6 % (ref 0–4)
CK MB SERPL-MCNC: 1.2 NG/ML (ref 5–25)
CK SERPL-CCNC: 73 U/L (ref 26–192)
CO2 SERPL-SCNC: 22 MMOL/L (ref 21–32)
CREAT SERPL-MCNC: 0.62 MG/DL (ref 0.6–1.3)
DIAGNOSIS, 93000: NORMAL
DIFFERENTIAL METHOD BLD: ABNORMAL
EOSINOPHIL # BLD: 0.5 K/UL (ref 0–0.4)
EOSINOPHIL NFR BLD: 4 % (ref 0–5)
ERYTHROCYTE [DISTWIDTH] IN BLOOD BY AUTOMATED COUNT: 13.5 % (ref 11.6–14.5)
GLOBULIN SER CALC-MCNC: 4.3 G/DL (ref 2–4)
GLUCOSE SERPL-MCNC: 86 MG/DL (ref 74–99)
HCT VFR BLD AUTO: 30.8 % (ref 35–45)
HGB BLD-MCNC: 10.2 G/DL (ref 12–16)
LYMPHOCYTES # BLD AUTO: 20 % (ref 21–52)
LYMPHOCYTES # BLD: 2.4 K/UL (ref 0.9–3.6)
MCH RBC QN AUTO: 26.4 PG (ref 24–34)
MCHC RBC AUTO-ENTMCNC: 33.1 G/DL (ref 31–37)
MCV RBC AUTO: 79.6 FL (ref 74–97)
MONOCYTES # BLD: 1.1 K/UL (ref 0.05–1.2)
MONOCYTES NFR BLD AUTO: 10 % (ref 3–10)
NEUTS SEG # BLD: 7.8 K/UL (ref 1.8–8)
NEUTS SEG NFR BLD AUTO: 66 % (ref 40–73)
P-R INTERVAL, ECG05: 144 MS
PLATELET # BLD AUTO: 250 K/UL (ref 135–420)
PMV BLD AUTO: 9.6 FL (ref 9.2–11.8)
POTASSIUM SERPL-SCNC: 3.3 MMOL/L (ref 3.5–5.5)
PROT SERPL-MCNC: 7.2 G/DL (ref 6.4–8.2)
Q-T INTERVAL, ECG07: 388 MS
QRS DURATION, ECG06: 76 MS
QTC CALCULATION (BEZET), ECG08: 447 MS
RBC # BLD AUTO: 3.87 M/UL (ref 4.2–5.3)
SODIUM SERPL-SCNC: 139 MMOL/L (ref 136–145)
TROPONIN I SERPL-MCNC: <0.02 NG/ML (ref 0–0.04)
VENTRICULAR RATE, ECG03: 80 BPM
WBC # BLD AUTO: 11.9 K/UL (ref 4.6–13.2)

## 2017-06-03 PROCEDURE — 82550 ASSAY OF CK (CPK): CPT | Performed by: EMERGENCY MEDICINE

## 2017-06-03 PROCEDURE — 99285 EMERGENCY DEPT VISIT HI MDM: CPT

## 2017-06-03 PROCEDURE — 80053 COMPREHEN METABOLIC PANEL: CPT | Performed by: EMERGENCY MEDICINE

## 2017-06-03 PROCEDURE — 85025 COMPLETE CBC W/AUTO DIFF WBC: CPT | Performed by: EMERGENCY MEDICINE

## 2017-06-03 PROCEDURE — 74011250636 HC RX REV CODE- 250/636: Performed by: EMERGENCY MEDICINE

## 2017-06-03 PROCEDURE — 96360 HYDRATION IV INFUSION INIT: CPT

## 2017-06-03 PROCEDURE — 93005 ELECTROCARDIOGRAM TRACING: CPT

## 2017-06-03 RX ADMIN — SODIUM CHLORIDE 1000 ML: 900 INJECTION, SOLUTION INTRAVENOUS at 02:13

## 2017-06-03 NOTE — DISCHARGE INSTRUCTIONS
Chest Pain: Care Instructions  Your Care Instructions  There are many things that can cause chest pain. Some are not serious and will get better on their own in a few days. But some kinds of chest pain need more testing and treatment. Your doctor may have recommended a follow-up visit in the next 8 to 12 hours. If you are not getting better, you may need more tests or treatment. Even though your doctor has released you, you still need to watch for any problems. The doctor carefully checked you, but sometimes problems can develop later. If you have new symptoms or if your symptoms do not get better, get medical care right away. If you have worse or different chest pain or pressure that lasts more than 5 minutes or you passed out (lost consciousness), call 911 or seek other emergency help right away. A medical visit is only one step in your treatment. Even if you feel better, you still need to do what your doctor recommends, such as going to all suggested follow-up appointments and taking medicines exactly as directed. This will help you recover and help prevent future problems. How can you care for yourself at home? · Rest until you feel better. · Take your medicine exactly as prescribed. Call your doctor if you think you are having a problem with your medicine. · Do not drive after taking a prescription pain medicine. When should you call for help? Call 911 if:  · You passed out (lost consciousness). · You have severe difficulty breathing. · You have symptoms of a heart attack. These may include:  ¨ Chest pain or pressure, or a strange feeling in your chest.  ¨ Sweating. ¨ Shortness of breath. ¨ Nausea or vomiting. ¨ Pain, pressure, or a strange feeling in your back, neck, jaw, or upper belly or in one or both shoulders or arms. ¨ Lightheadedness or sudden weakness. ¨ A fast or irregular heartbeat.   After you call 911, the  may tell you to chew 1 adult-strength or 2 to 4 low-dose aspirin. Wait for an ambulance. Do not try to drive yourself. Call your doctor today if:  · You have any trouble breathing. · Your chest pain gets worse. · You are dizzy or lightheaded, or you feel like you may faint. · You are not getting better as expected. · You are having new or different chest pain. Where can you learn more? Go to http://shirley-gilbert.info/. Enter A120 in the search box to learn more about \"Chest Pain: Care Instructions. \"  Current as of: May 27, 2016  Content Version: 11.2  © 2054-3460 [employer]. Care instructions adapted under license by [employer] (which disclaims liability or warranty for this information). If you have questions about a medical condition or this instruction, always ask your healthcare professional. [address] any warranty or liability for your use of this information.

## 2017-06-03 NOTE — ED NOTES
Hourly rounding complete. Safety  Pt resting   [x  ]  On stretcher with side rails up and bed in locked position, call bell within reach  [  ]  Sitting in chair with casters locked, call bell within reach    Toileting  [ x ] pt denies need to use bathroom  [  ] pt assisted to bathroom  [  ] pt assisted with bedpan  [  ] pt independent to bathroom as needed    Ongoing Plan of Care  Plan of care and expected time for test and results reviewed with pt.     Pain Management / Comfort  [  ] dimmed lights  [  ] warm blanket provided  [  ] pain assessed  [  x] monitor alarms reviewed

## 2017-06-03 NOTE — ED NOTES
Reviewed DC instructions with patient. Pt verbalized an understanding. Pt signed paper DC instructions; RN placed in scan bin. Pt instructed to follow up with PCP this week. Pt left ED with no distress noted. Pt left ED with family. Pt was AOX4.

## 2017-06-03 NOTE — ED NOTES
Ambulated patient 1 lap around ED. Pt tolerated well. Pts oxygen saturation was between 95-99%, HR 89-93 bpm. Feliz Gutierrez MD made aware.

## 2017-06-03 NOTE — ED TRIAGE NOTES
Per medic patient came from home c/o of chest heaviness and left shoulder discomfort. Pt is 22 weeks: Due 10/6/2017. GYN [facility]. [facility] 105. Pt ate dinner 1 hour ago which was a cheeseburger and chicken nuggets and went to bed immediately after. NS on ED monitor. Stable vitals. Pt ambulated from EMS stretcher to ED stretcher. Pt is AOX4.

## 2017-06-03 NOTE — ED PROVIDER NOTES
HPI Comments: 2:10 AM Karly Fuentes is a 25 y.o. 25week old pregnant female G1 with a history of Migraines who presents to the emergency department c/o chest pain onset a couple hours ago. Pt describes the pain as \"pressure\". The patient explains associated symptoms of heart palpitations and back pain. Pt notes no history of symptoms. Pt also c/o minimal SOB. Pt denies fever, cough, abdominal pain, and vaginal bleeding currenlty. Pt reports having spotting a couple weeks ago, but has resolved. No other concerns at this time. PCP: Luis Alberto Leach MD'    The history is provided by the patient. Past Medical History:   Diagnosis Date    Migraines     Migraines        History reviewed. No pertinent surgical history. History reviewed. No pertinent family history. Social History     Social History    Marital status: SINGLE     Spouse name: N/A    Number of children: N/A    Years of education: N/A     Occupational History    Not on file. Social History Main Topics    Smoking status: Never Smoker    Smokeless tobacco: Not on file    Alcohol use No    Drug use: No    Sexual activity: Not on file     Other Topics Concern    Not on file     Social History Narrative         ALLERGIES: Mushroom combination no.1    Review of Systems   Constitutional: Negative for fever. HENT: Negative for congestion. Respiratory: Positive for shortness of breath. Negative for cough. Cardiovascular: Positive for chest pain. Negative for leg swelling. Gastrointestinal: Negative for abdominal pain, nausea and vomiting. Genitourinary: Negative for dysuria and vaginal bleeding. Musculoskeletal: Positive for back pain. Neurological: Negative for speech difficulty and headaches. All other systems reviewed and are negative.       Vitals:    06/03/17 0300 06/03/17 0301 06/03/17 0315 06/03/17 0330   BP: 102/54  106/52 104/59   Pulse: 79 80 77 82   Resp: 20 22 18 17   Temp:       SpO2: 100% 100% 100% 100% Weight:       Height:                Physical Exam   Constitutional: She is oriented to person, place, and time. No distress. HENT:   Head: Atraumatic. Eyes: Conjunctivae are normal.   Neck: Normal range of motion. Neck supple. No JVD present. Cardiovascular: Normal rate, regular rhythm and normal heart sounds. No murmur heard. Pulmonary/Chest: Effort normal and breath sounds normal. She exhibits no tenderness. Abdominal: Soft. Bowel sounds are normal. She exhibits no distension. There is no tenderness. There is no rebound and no guarding. Genitourinary:   Genitourinary Comments: Gravid uterus. Musculoskeletal: Normal range of motion. She exhibits no edema or tenderness. Neurological: She is alert and oriented to person, place, and time. No cranial nerve deficit. Skin: Skin is warm and dry. Psychiatric: She has a normal mood and affect. Nursing note and vitals reviewed. MDM  Number of Diagnoses or Management Options  Acute chest pain:   Pregnancy, unspecified gestational age:   Diagnosis management comments: Tony Parker is a 25 y.o. female presenting with cp. Exam reassuring. Not c/w PE. Labs obtained and I have discussed results of work up with patient. Fluid bolus given and pt observed in ED. She was able to ambulate around ED without any change in vitals or complaints. I have instructed patient to continue supportive care at home with rest and po fluids and follow up with OB as scheduled. Return precautions discussed. Patient stated verbal understanding and agrees with course and plan.        ED Course       Procedures      Vitals:  Patient Vitals for the past 12 hrs:   Temp Pulse Resp BP SpO2   06/03/17 0330 - 82 17 104/59 100 %   06/03/17 0315 - 77 18 106/52 100 %   06/03/17 0301 - 80 22 - 100 %   06/03/17 0300 - 79 20 102/54 100 %   06/03/17 0245 - 80 20 103/63 100 %   06/03/17 0230 - 90 24 99/64 100 %   06/03/17 0215 - 83 19 101/56 100 %   06/03/17 0200 - 96 18 100/43 99 % 06/03/17 0145 - 87 18 94/63 100 %   06/03/17 0130 - 88 21 102/61 100 %   06/03/17 0115 - 84 18 110/58 99 %   06/03/17 0103 98.1 °F (36.7 °C) 81 15 110/55 100 %           EKG interpretation by ED Physician:  106 NSR of 80 bpm, no stemi. Disposition:  Diagnosis:   1. Acute chest pain    2. Pregnancy, unspecified gestational age        Disposition: Discharged home in stable condition           [facility] for and in presence of Marilyn Casey MD (06/03/17)      Physician Attestation  I personally preformed the services described in this documentation, reviewed and edited the documentation which was dictated to the scribe in my presence, and it accurately records my words and actions.      Marilyn Casey MD (06/03/17)      Signed by: Awilda Bradford, 06/03/17, 2:10 AM

## 2017-11-10 ENCOUNTER — APPOINTMENT (OUTPATIENT)
Dept: GENERAL RADIOLOGY | Age: 22
End: 2017-11-10
Attending: PHYSICIAN ASSISTANT
Payer: COMMERCIAL

## 2017-11-10 ENCOUNTER — HOSPITAL ENCOUNTER (EMERGENCY)
Age: 22
Discharge: HOME | End: 2017-11-10
Attending: EMERGENCY MEDICINE
Payer: COMMERCIAL

## 2017-11-10 VITALS
SYSTOLIC BLOOD PRESSURE: 104 MMHG | HEART RATE: 86 BPM | TEMPERATURE: 98.1 F | BODY MASS INDEX: 31.89 KG/M2 | DIASTOLIC BLOOD PRESSURE: 69 MMHG | WEIGHT: 180 LBS | OXYGEN SATURATION: 97 % | RESPIRATION RATE: 16 BRPM

## 2017-11-10 DIAGNOSIS — M54.9 MUSCULOSKELETAL BACK PAIN: Primary | ICD-10-CM

## 2017-11-10 PROCEDURE — 99282 EMERGENCY DEPT VISIT SF MDM: CPT

## 2017-11-10 PROCEDURE — 71020 XR CHEST PA LAT: CPT

## 2017-11-10 RX ORDER — IBUPROFEN 600 MG/1
600 TABLET ORAL
Status: DISCONTINUED | OUTPATIENT
Start: 2017-11-10 | End: 2017-11-10 | Stop reason: HOSPADM

## 2017-11-10 NOTE — DISCHARGE INSTRUCTIONS
Learning About How to Have a Healthy Back  What causes back pain? Back pain is often caused by overuse, strain, or injury. For example, people often hurt their backs playing sports or working in the yard, being jolted in a car accident, or lifting something too heavy. Aging plays a part too. Your bones and muscles tend to lose strength as you age, which makes injury more likely. The spongy discs between the bones of the spine (vertebrae) may suffer from wear and tear and no longer provide enough cushion between the bones. A disc that bulges or breaks open (herniated disc) can press on nerves, causing back pain. In some people, back pain is the result of arthritis, broken vertebrae caused by bone loss (osteoporosis), illness, or a spine problem. Although most people have back pain at one time or another, there are steps you can take to make it less likely. How can you have a healthy back? Reduce stress on your back through good posture  Slumping or slouching alone may not cause low back pain. But after the back has been strained or injured, bad posture can make pain worse. · Sleep in a position that maintains your back's normal curves and on a mattress that feels comfortable. Sleep on your side with a pillow between your knees, or sleep on your back with a pillow under your knees. These positions can reduce strain on your back. · Stand and sit up straight. \"Good posture\" generally means your ears, shoulders, and hips are in a straight line. · If you must stand for a long time, put one foot on a stool, ledge, or box. Switch feet every now and then. · Sit in a chair that is low enough to let you place both feet flat on the floor with both knees nearly level with your hips. If your chair or desk is too high, use a footrest to raise your knees. Place a small pillow, a rolled-up towel, or a lumbar roll in the curve of your back if you need extra support.   · Try a kneeling chair, which helps tilt your hips forward. This takes pressure off your lower back. · Try sitting on an exercise ball. It can rock from side to side, which helps keep your back loose. · When driving, keep your knees nearly level with your hips. Sit straight, and drive with both hands on the steering wheel. Your arms should be in a slightly bent position. Reduce stress on your back through careful lifting  · Squat down, bending at the hips and knees only. If you need to, put one knee to the floor and extend your other knee in front of you, bent at a right angle (half kneeling). · Press your chest straight forward. This helps keep your upper back straight while keeping a slight arch in your low back. · Hold the load as close to your body as possible, at the level of your belly button (navel). · Use your feet to change direction, taking small steps. · Lead with your hips as you change direction. Keep your shoulders in line with your hips as you move. · Set down your load carefully, squatting with your knees and hips only. Exercise and stretch your back  · Do some exercise on most days of the week, if your doctor says it is okay. You can walk, run, swim, or cycle. · Stretch your back muscles. Here are a few exercises to try:  [place]  on your back, and gently pull one bent knee to your chest. Put that foot back on the floor, and then pull the other knee to your chest.  ¨ Do pelvic tilts. Lie on your back with your knees bent. Tighten your stomach muscles. Pull your belly button (navel) in and up toward your ribs. You should feel like your back is pressing to the floor and your hips and pelvis are slightly lifting off the floor. Hold for 6 seconds while breathing smoothly. ¨ Sit with your back flat against a wall. · Keep your core muscles strong. The muscles of your back, belly (abdomen), and buttocks support your spine. ¨ Pull in your belly and imagine pulling your navel toward your spine. Hold this for 6 seconds, then relax.  Remember to keep breathing normally as you tense your muscles. ¨ Do curl-ups. Always do them with your knees bent. Keep your low back on the floor, and curl your shoulders toward your knees using a smooth, slow motion. Keep your arms folded across your chest. If this bothers your neck, try putting your hands behind your neck (not your head), with your elbows spread apart. ¨ Lie on your back with your knees bent and your feet flat on the floor. Tighten your belly muscles, and then push with your feet and raise your buttocks up a few inches. Hold this position 6 seconds as you continue to breathe normally, then lower yourself slowly to the floor. Repeat 8 to 12 times. ¨ If you like group exercise, try Pilates or yoga. These classes have poses that strengthen the core muscles. Lead a healthy lifestyle  · Stay at a healthy weight to avoid strain on your back. · Do not smoke. Smoking increases the risk of osteoporosis, which weakens the spine. If you need help quitting, talk to your doctor about stop-smoking programs and medicines. These can increase your chances of quitting for good. Where can you learn more? Go to http://shirley-gilbert.info/. Enter L315 in the search box to learn more about \"Learning About How to Have a Healthy Back. \"  Current as of: March 21, 2017  Content Version: 11.4  © 2746-6746 [employer]. Care instructions adapted under license by [employer] (which disclaims liability or warranty for this information). If you have questions about a medical condition or this instruction, always ask your healthcare professional. [place] [address] any warranty or liability for your use of this information.

## 2017-11-10 NOTE — ED TRIAGE NOTES
Observation/Bedded OP   PT Plan of Care Note    Primary Insurance: MEDICARE    Secondary Insurance: ANTHEM/BCBS      Is the patient currently receiving skilled home care services (RN or therapy) No    Diagnosis:   1. Acute urinary retention    2. Chronic atrial fibrillation    3. Elevated INR    4. Mild dehydration    5. Elevated CK        Co morbidities:   Patient Active Problem List   Diagnosis   • Acne rosacea   • Insomnia   • Hyperlipidemia   • HTN (hypertension)   • Gout   • Diverticulosis   • Carotid bruit   • Rhinitis   • Other dyspnea and respiratory abnormality   • Encounter for monitoring coumadin therapy   • Rotator cuff arthropathy   • Blood in stool   • Generalized weakness   • Decubitus ulcer of buttock, stage 2   • Microhematuria   • Paroxysmal atrial fibrillation   • Stenosis of carotid artery   • Benign non-nodular prostatic hyperplasia without lower urinary tract symptoms       Clinical Presentation: Stable and/or uncomplicated characteristics                        Functional Status: (as of date/time noted)  Bed Mobility:  Rolling to the Left: Modified Independent (03/17/17 1400)  Supine to Sit: Modified Independent (03/17/17 1400)  Bed Mobility Comments: uses railing, no physical assist,  (03/17/17 1400)    Transfers:  Sit to Stand: Independent (03/17/17 1400)  Stand to Sit: Independent (03/17/17 1400)  Stand Pivot Transfers: Independent (03/17/17 1400)  Assistive Device/: 1 Person;Gait Belt (03/17/17 1400)     Ambulation:  Gait  Gait Assistance: Modified Independent (03/17/17 1400)  Assistive Device/: 2-wheeled walker;1 Person;Gait Belt (03/17/17 1400)  Ambulation Distance (Feet): 50 Feet (03/17/17 1400)  Gait Comments 1: no loss of balance (03/17/17 1400)  Second Trial: Yes (03/17/17 1400)  Gait - Second Trial  Gait Assistance: Supervision (Supv) (03/17/17 1400)  Assistive Device/: 1 Person;Gait Belt (03/17/17 1400)  Ambulation Distance (Feet): 450 Feet  Pt. states \"I woke up this morning with excruitiating pain in my back and it hurts with deep breaths and it's making me lightheaded\".  Denies any N/V. (03/17/17 1400)  Gait Comments 1: supervision for safety, no loss of balance with balance challenges/head turns (03/17/17 1400)         Stairs:  Stairs Mobility  Number of Stairs: 4 (03/17/17 1400)  Stair Management Assistance: Supervision (Supv) (03/17/17 1400)  Stair Management Technique: Two rails;Alternating pattern;Forwards;With gait belt (03/17/17 1400)  See PT Flowsheet for full details regarding the PT therapy provided.    Education: On this date, the patient was educated on PT role, balance testing.   The response to education was: Verbalizes understanding    Assessment:  Patient admitted from home alone. He does not typically rely on any assistive device to ambulate, has 3 steps to enter his home, and is fairly independent. Patient presents with no loss of strength, no assistive device needed for ambulation, and a balance test: Dynamic Gait Index Score of 24/24 indicating that > 22/24 = safe ambulators. Patient with supervision only for potential to fall, but no loss of balance noted. Safe to DC home alone, may need home safety evaluation from therapy (per sister who is present  \"it is a mess\").   Barriers to Discharge:   none    Treatment Interventions:    Amount: 0-30 minutes  Frequency:     Duration: 1 day      Recommendations for Discharge: Home    Billing Information:    Timed Procedures:   ,  ,  ,  ,  ,  ,  ,  ,  ,  ,  ,  ,      Untimed Procedures:   ,  ,  ,  ,  ,  , $ PT Eval: Low Complexity: 1 Procedure,   ,      G-Codes:  $ Mobility - Current Status: CH, $ Mobility - Goal: CH, $ Mobility - Discharge Status: CH,  ,  ,  ,  ,  ,  ,  ,  ,  ,  ,  ,  ,  ,  ,      Total Treatment Time:   PT Time Spent: 32 minutes    Timed Treatment Minutes:       The co-signature indicates that the physician certifies the need for PT furnished under this plan of treatment while under his/her care.

## 2017-11-10 NOTE — ED PROVIDER NOTES
HPI Comments: Alex Kilgore is a 25 y.o. Female c/o pain to her back underneath her Lt shoulder blade that started this morning when she woke up. Pt is 1 month post-partum and is breastfeeding her daughter. She denies sleeping in an uncomfortable position but states she is often hunched over and breastfeeding while lying down. Pt says she took Ibuprofen and went back to sleep. She came to the ED because the pain has not improved throughout the day. No neck pain, numbness, tingling, weakness, HA, f/c. No CP, palpitations or LE swelling. No cough, hemoptysis. No recent illness or sick contacts. She states that the pain is worsened by deep inspiration and she feels like she cannot take full, deep breaths due to pain. She denies labored breathing or shortness of breath. No recent surgeries. Pt is not on any medications. Pt had a , no complications. No complications during pregnancy. Patient is a 25 y.o. female presenting with back pain. The history is provided by the patient. Back Pain    This is a new problem. The current episode started 6 to 12 hours ago. The problem has not changed since onset. Patient reports not work related injury. The pain is associated with no known injury (deep inspiration). Pain location: Under Lt scapula. The quality of the pain is described as sharp. The pain does not radiate. The pain is at a severity of 5/10. The pain is moderate. Pertinent negatives include no chest pain, no fever, no numbness, no headaches, no abdominal pain, no leg pain, no paresthesias, no paresis, no tingling and no weakness. She has tried NSAIDs for the symptoms. The treatment provided mild relief. Past Medical History:   Diagnosis Date    Migraines     Migraines        No past surgical history on file. No family history on file.     Social History     Social History    Marital status: SINGLE     Spouse name: N/A    Number of children: N/A    Years of education: N/A Occupational History    Not on file. Social History Main Topics    Smoking status: Never Smoker    Smokeless tobacco: Not on file    Alcohol use No    Drug use: No    Sexual activity: Not on file     Other Topics Concern    Not on file     Social History Narrative         ALLERGIES: Mushroom combination no.1    Review of Systems   Constitutional: Negative for activity change, chills, diaphoresis and fever. HENT: Negative for congestion, rhinorrhea and sore throat. Respiratory: Negative for cough, chest tightness, shortness of breath and wheezing. Cardiovascular: Negative for chest pain, palpitations and leg swelling. Gastrointestinal: Negative for abdominal pain, diarrhea, nausea and vomiting. Musculoskeletal: Positive for back pain. Negative for joint swelling, myalgias, neck pain and neck stiffness. Skin: Negative for pallor, rash and wound. Neurological: Positive for dizziness. Negative for tingling, tremors, speech difficulty, weakness, light-headedness, numbness, headaches and paresthesias. Psychiatric/Behavioral: Negative. Vitals:    11/10/17 1533   BP: 104/69   Pulse: 86   Resp: 16   Temp: 98.1 °F (36.7 °C)   SpO2: 97%   Weight: 81.6 kg (180 lb)            Physical Exam   Constitutional: She is oriented to person, place, and time. Vital signs are normal. She appears well-developed and well-nourished. She is active. No distress. Well-appearing, in NAD, pleasant, cooperative   HENT:   Head: Normocephalic and atraumatic. Eyes: Conjunctivae and EOM are normal. Pupils are equal, round, and reactive to light. Neck: Normal range of motion. Neck supple. Cardiovascular: Normal rate, regular rhythm and normal heart sounds. Pulmonary/Chest: Effort normal and breath sounds normal. No accessory muscle usage. No respiratory distress. She has no decreased breath sounds. She has no wheezes. She has no rhonchi. She has no rales. Abdominal: Soft.  Bowel sounds are normal. There is no tenderness. There is no rebound. Musculoskeletal: Normal range of motion. She exhibits no edema. Left shoulder: Normal. She exhibits normal range of motion, no tenderness, no bony tenderness, no swelling, no deformity and no pain. Thoracic back: She exhibits tenderness. She exhibits normal range of motion, no swelling, no edema and no deformity. Back:    Mild tenderness inferior-medial aspect Lt scapula    Full active ROM LT shoulder, elbow, wrist.    No deformity, no spinal tenderness to palpation. Neurological: She is alert and oriented to person, place, and time. She has normal strength. Skin: Skin is warm. Psychiatric: She has a normal mood and affect. Judgment and thought content normal.   Nursing note and vitals reviewed. MDM  Number of Diagnoses or Management Options  Diagnosis management comments: Pain beneath Lt scapula, worse with deep inspiration, atraumatic. Tenderness to inferior-medial aspect of Lt scapula, likely musculoskeletal.  Check CXR  PERC and Wells criteria negative. NSAIDs and discharge with follow up and instructions to return if symptoms worsen. Patient Progress  Patient progress: improved    ED Course       Procedures    Diagnosis:   1. Musculoskeletal back pain          Disposition: home    Follow-up Information     Follow up With Details Comments [id number] Lucy Gayle MD Call in 2 days for re-evaluation [address] [facility] [address] [place] [address]  [phone]            Patient's Medications   Start Taking    No medications on file   Continue Taking    PRENATAL MULTIVIT-CA-MIN-FE-FA (PRENATAL VITAMIN) TAB    Take 1 Tab by mouth daily. Indications: Pregnancy   These Medications have changed    No medications on file   Stop Taking    No medications on file       The patient will be discharged home. Warning signs of worsening condition were discussed and the patient verbalized understanding. Based on patient's age, coexisting illness, exam, and the results of this ED evaluation, the decision to treat as an outpatient was made. While it is impossible to completely exclude the possibility of underlying serious disease or worsening of condition, I feel the relative likelihood is extremely low. I discussed this uncertainty with the patient, who understood ED evaluation and treatment and felt comfortable with the outpatient treatment plan. All questions regarding care, test results, and follow up were answered. The patient is stable and appropriate to discharge. Patient understands importance to return to the emergency department for any new or worsening symptoms. I stressed the importance of follow up for repeat assessment and possibly further evaluation/treatment.     Kavya Plunkett PA-C

## 2018-09-24 ENCOUNTER — HOSPITAL ENCOUNTER (EMERGENCY)
Age: 23
Discharge: HOME | End: 2018-09-24
Attending: EMERGENCY MEDICINE
Payer: COMMERCIAL

## 2018-09-24 VITALS
WEIGHT: 189 LBS | HEIGHT: 63 IN | BODY MASS INDEX: 33.49 KG/M2 | RESPIRATION RATE: 16 BRPM | OXYGEN SATURATION: 100 % | TEMPERATURE: 98.1 F | HEART RATE: 64 BPM | DIASTOLIC BLOOD PRESSURE: 42 MMHG | SYSTOLIC BLOOD PRESSURE: 111 MMHG

## 2018-09-24 DIAGNOSIS — R42 LIGHTHEADEDNESS: ICD-10-CM

## 2018-09-24 DIAGNOSIS — N93.8 DUB (DYSFUNCTIONAL UTERINE BLEEDING): Primary | ICD-10-CM

## 2018-09-24 LAB
ALBUMIN SERPL-MCNC: 4 G/DL (ref 3.4–5)
ALBUMIN/GLOB SERPL: 0.9 {RATIO} (ref 0.8–1.7)
ALP SERPL-CCNC: 90 U/L (ref 45–117)
ALT SERPL-CCNC: 16 U/L (ref 13–56)
ANION GAP SERPL CALC-SCNC: 6 MMOL/L (ref 3–18)
APPEARANCE UR: CLEAR
AST SERPL-CCNC: 15 U/L (ref 15–37)
BACTERIA URNS QL MICRO: NEGATIVE /HPF
BASOPHILS # BLD: 0.1 K/UL (ref 0–0.1)
BASOPHILS NFR BLD: 1 % (ref 0–2)
BILIRUB SERPL-MCNC: 0.3 MG/DL (ref 0.2–1)
BILIRUB UR QL: NEGATIVE
BUN SERPL-MCNC: 7 MG/DL (ref 7–18)
BUN/CREAT SERPL: 10 (ref 12–20)
CALCIUM SERPL-MCNC: 8.8 MG/DL (ref 8.5–10.1)
CHLORIDE SERPL-SCNC: 105 MMOL/L (ref 100–108)
CO2 SERPL-SCNC: 28 MMOL/L (ref 21–32)
COLOR UR: YELLOW
CREAT SERPL-MCNC: 0.68 MG/DL (ref 0.6–1.3)
DIFFERENTIAL METHOD BLD: NORMAL
EOSINOPHIL # BLD: 0.4 K/UL (ref 0–0.4)
EOSINOPHIL NFR BLD: 5 % (ref 0–5)
EPITH CASTS URNS QL MICRO: NORMAL /LPF (ref 0–5)
ERYTHROCYTE [DISTWIDTH] IN BLOOD BY AUTOMATED COUNT: 13.8 % (ref 11.6–14.5)
GLOBULIN SER CALC-MCNC: 4.3 G/DL (ref 2–4)
GLUCOSE SERPL-MCNC: 75 MG/DL (ref 74–99)
GLUCOSE UR STRIP.AUTO-MCNC: NEGATIVE MG/DL
HCG UR QL: NEGATIVE
HCT VFR BLD AUTO: 36.8 % (ref 35–45)
HGB BLD-MCNC: 12 G/DL (ref 12–16)
HGB UR QL STRIP: ABNORMAL
KETONES UR QL STRIP.AUTO: NEGATIVE MG/DL
LEUKOCYTE ESTERASE UR QL STRIP.AUTO: NEGATIVE
LYMPHOCYTES # BLD: 2.6 K/UL (ref 0.9–3.6)
LYMPHOCYTES NFR BLD: 37 % (ref 21–52)
MCH RBC QN AUTO: 25.5 PG (ref 24–34)
MCHC RBC AUTO-ENTMCNC: 32.6 G/DL (ref 31–37)
MCV RBC AUTO: 78.3 FL (ref 74–97)
MONOCYTES # BLD: 0.7 K/UL (ref 0.05–1.2)
MONOCYTES NFR BLD: 10 % (ref 3–10)
NEUTS SEG # BLD: 3.4 K/UL (ref 1.8–8)
NEUTS SEG NFR BLD: 47 % (ref 40–73)
NITRITE UR QL STRIP.AUTO: NEGATIVE
PH UR STRIP: 7 [PH] (ref 5–8)
PLATELET # BLD AUTO: 281 K/UL (ref 135–420)
PMV BLD AUTO: 9.5 FL (ref 9.2–11.8)
POTASSIUM SERPL-SCNC: 3.6 MMOL/L (ref 3.5–5.5)
PROT SERPL-MCNC: 8.3 G/DL (ref 6.4–8.2)
PROT UR STRIP-MCNC: NEGATIVE MG/DL
RBC # BLD AUTO: 4.7 M/UL (ref 4.2–5.3)
RBC #/AREA URNS HPF: NORMAL /HPF (ref 0–5)
SODIUM SERPL-SCNC: 139 MMOL/L (ref 136–145)
SP GR UR REFRACTOMETRY: 1.01 (ref 1–1.03)
UROBILINOGEN UR QL STRIP.AUTO: 0.2 EU/DL (ref 0.2–1)
WBC # BLD AUTO: 7.2 K/UL (ref 4.6–13.2)
WBC URNS QL MICRO: NORMAL /HPF (ref 0–5)

## 2018-09-24 PROCEDURE — 80053 COMPREHEN METABOLIC PANEL: CPT | Performed by: EMERGENCY MEDICINE

## 2018-09-24 PROCEDURE — 96360 HYDRATION IV INFUSION INIT: CPT

## 2018-09-24 PROCEDURE — 85025 COMPLETE CBC W/AUTO DIFF WBC: CPT | Performed by: EMERGENCY MEDICINE

## 2018-09-24 PROCEDURE — 81025 URINE PREGNANCY TEST: CPT | Performed by: EMERGENCY MEDICINE

## 2018-09-24 PROCEDURE — 99283 EMERGENCY DEPT VISIT LOW MDM: CPT

## 2018-09-24 PROCEDURE — 74011250636 HC RX REV CODE- 250/636: Performed by: EMERGENCY MEDICINE

## 2018-09-24 PROCEDURE — 81001 URINALYSIS AUTO W/SCOPE: CPT | Performed by: EMERGENCY MEDICINE

## 2018-09-24 RX ADMIN — SODIUM CHLORIDE 1000 ML: 900 INJECTION, SOLUTION INTRAVENOUS at 20:00

## 2018-09-24 NOTE — ED PROVIDER NOTES
EMERGENCY DEPARTMENT HISTORY AND PHYSICAL EXAM    6:58 PM      Date: 9/24/2018  Patient Name: Magi Brand    History of Presenting Illness     No chief complaint on file. History Provided By: Patient    Chief Complaint: Vaginal Bleeding  Duration:  13 days ago  Timing:  Constant  Location: Vagina  Quality: N/A  Severity: Moderate  Modifying Factors: There are no modifying factors  Associated Symptoms: denies any other associated signs or symptoms      Additional History (Context): 7:31 PM Magi Brand is a 26 y.o. female with h/o migraines, not a smoker, and does not use alcohol who presents to ED complaining of vaginal bleeding onset 13 days ago. Patient went to [facility] but was not seen because the line was too long so she left. Says that her menstrual cycle normally lasts six days but she has been bleeding for 13 days. She states that the other day she blood through a pad every hour for 3 hours and then it stopped. This morning she went to the bathroom and there was a lot of blood in the toilet bowl. Pt says she woke up light headed. States that she is anemic and takes Iron. She had a baby in October and since has been on the mini pill. Before starting on the mini pill, she would get her period 2-3 times a month. Denies any SOB, FHx bleeding problems, pain upon urination, or pelvic pain. States that she has small amount of cramping. Gynecologist is [employer] [facility]. No other concerns or symptoms at this time. PCP: Carmine Soliman MD      Past History     Past Medical History:  Past Medical History:   Diagnosis Date    Migraines     Migraines        Past Surgical History:  No past surgical history on file. Family History:  No family history on file. Social History:  Social History   Substance Use Topics    Smoking status: Never Smoker    Smokeless tobacco: Not on file    Alcohol use No       Allergies:   Allergies   Allergen Reactions    Mushroom Combination No.1 Swelling Review of Systems       Review of Systems   Constitutional: Negative for fever. HENT: Negative for sore throat. Eyes: Negative for redness. Respiratory: Negative for shortness of breath and wheezing. Gastrointestinal: Negative for abdominal pain and nausea. Genitourinary: Positive for menstrual problem. Negative for difficulty urinating, dysuria, pelvic pain, urgency, vaginal discharge and vaginal pain. Musculoskeletal: Negative for neck stiffness. Skin: Negative for pallor. Neurological: Negative for headaches. Hematological: Does not bruise/bleed easily. All other systems reviewed and are negative. Physical Exam     Visit Vitals    /42    Pulse 64    Temp 98.1 °F (36.7 °C)    Resp 16    Ht 5' 3\" (1.6 m)    Wt 85.7 kg (189 lb)    SpO2 100%    BMI 33.48 kg/m2         Physical Exam   Constitutional: She is oriented to person, place, and time. She appears well-developed and well-nourished. No distress. HENT:   Head: Normocephalic and atraumatic. Mouth/Throat: Oropharynx is clear and moist and mucous membranes are normal.   Eyes: Conjunctivae and EOM are normal. Pupils are equal, round, and reactive to light. No scleral icterus. Neck: Normal range of motion. Neck supple. Cardiovascular: Normal rate and intact distal pulses. Exam reveals no gallop and no friction rub. Capillary refill < 3 seconds   Pulmonary/Chest: Effort normal and breath sounds normal. No respiratory distress. She has no wheezes. Lungs are clear   Abdominal: Soft. Bowel sounds are normal. She exhibits no distension. There is no tenderness. Hernia confirmed negative in the left inguinal area. No suprapubic tenderness   Musculoskeletal: Normal range of motion. She exhibits no edema. Lymphadenopathy:     She has no cervical adenopathy. Neurological: She is alert and oriented to person, place, and time. No cranial nerve deficit. Coordination normal.   Skin: Skin is warm and dry.  She is not diaphoretic. Psychiatric: She has a normal mood and affect. Her behavior is normal.   Nursing note and vitals reviewed. Diagnostic Study Results     Labs -  Recent Results (from the past 12 hour(s))   CBC WITH AUTOMATED DIFF    Collection Time: 09/24/18  7:45 PM   Result Value Ref Range    WBC 7.2 4.6 - 13.2 K/uL    RBC 4.70 4.20 - 5.30 M/uL    HGB 12.0 12.0 - 16.0 g/dL    HCT 36.8 35.0 - 45.0 %    MCV 78.3 74.0 - 97.0 FL    MCH 25.5 24.0 - 34.0 PG    MCHC 32.6 31.0 - 37.0 g/dL    RDW 13.8 11.6 - 14.5 %    PLATELET 898 761 - 601 K/uL    MPV 9.5 9.2 - 11.8 FL    NEUTROPHILS 47 40 - 73 %    LYMPHOCYTES 37 21 - 52 %    MONOCYTES 10 3 - 10 %    EOSINOPHILS 5 0 - 5 %    BASOPHILS 1 0 - 2 %    ABS. NEUTROPHILS 3.4 1.8 - 8.0 K/UL    ABS. LYMPHOCYTES 2.6 0.9 - 3.6 K/UL    ABS. MONOCYTES 0.7 0.05 - 1.2 K/UL    ABS. EOSINOPHILS 0.4 0.0 - 0.4 K/UL    ABS. BASOPHILS 0.1 0.0 - 0.1 K/UL    DF AUTOMATED     METABOLIC PANEL, COMPREHENSIVE    Collection Time: 09/24/18  7:45 PM   Result Value Ref Range    Sodium 139 136 - 145 mmol/L    Potassium 3.6 3.5 - 5.5 mmol/L    Chloride 105 100 - 108 mmol/L    CO2 28 21 - 32 mmol/L    Anion gap 6 3.0 - 18 mmol/L    Glucose 75 74 - 99 mg/dL    BUN 7 7.0 - 18 MG/DL    Creatinine 0.68 0.6 - 1.3 MG/DL    BUN/Creatinine ratio 10 (L) 12 - 20      GFR est AA >60 >60 ml/min/1.73m2    GFR est non-AA >60 >60 ml/min/1.73m2    Calcium 8.8 8.5 - 10.1 MG/DL    Bilirubin, total 0.3 0.2 - 1.0 MG/DL    ALT (SGPT) 16 13 - 56 U/L    AST (SGOT) 15 15 - 37 U/L    Alk.  phosphatase 90 45 - 117 U/L    Protein, total 8.3 (H) 6.4 - 8.2 g/dL    Albumin 4.0 3.4 - 5.0 g/dL    Globulin 4.3 (H) 2.0 - 4.0 g/dL    A-G Ratio 0.9 0.8 - 1.7     URINALYSIS W/ RFLX MICROSCOPIC    Collection Time: 09/24/18  7:45 PM   Result Value Ref Range    Color YELLOW      Appearance CLEAR      Specific gravity 1.008 1.005 - 1.030      pH (UA) 7.0 5.0 - 8.0      Protein NEGATIVE  NEG mg/dL    Glucose NEGATIVE  NEG mg/dL    Ketone NEGATIVE  NEG mg/dL    Bilirubin NEGATIVE  NEG      Blood LARGE (A) NEG      Urobilinogen 0.2 0.2 - 1.0 EU/dL    Nitrites NEGATIVE  NEG      Leukocyte Esterase NEGATIVE  NEG     HCG URINE, QL    Collection Time: 09/24/18  7:45 PM   Result Value Ref Range    HCG urine, QL NEGATIVE  NEG         Radiologic Studies -   No orders to display         Medical Decision Making   I am the first provider for this patient. I reviewed the vital signs, available nursing notes, past medical history, past surgical history, family history and social history. Vital Signs-Reviewed the patient's vital signs. Pulse Oximetry Analysis -  100% on room air WNL    Cardiac Monitor:  Rate: 68 bpm    Records Reviewed: Nursing Notes and Old Medical Records (Time of Review: 6:58 PM)    Provider Notes (Medical Decision Making):  MDM  Number of Diagnoses or Management Options  Diagnosis management comments: DDx: DUB, blood loss anemia, iron deficient anemia, dehydration, metabolic. Will check labs, urine, give IV fluids. Medications   sodium chloride 0.9 % bolus infusion 1,000 mL (0 mL IntraVENous IV Completed 9/24/18 2040)       ED Course: Progress Notes, Reevaluation, and Consults:  Hbg wnl  Labs otw reassuring    Pt feeling better. I have reassessed the patient. I have discussed the workup, results and plan with the patient and patient is in agreement. Patient is feeling better. Patient was discharge in stable condition. Patient was given outpatient follow up. Patient is to return to emergency department if any new or worsening condition. Diagnosis     Clinical Impression:   1. DUB (dysfunctional uterine bleeding)    2.  Lightheadedness        Disposition: Discharged    Follow-up Information     Follow up With Details Comments Contact Info    [address] [facility] [place] Schedule an appointment as soon as possible for a visit in 1 day  [address] [facility] [address] [facility] EMERGENCY DEPT  As needed, If symptoms worsen [address] [address]  [address], [place] Schedule an appointment as soon as possible for a visit in 1 week  [place]   [phone]             Patient's Medications   Start Taking    No medications on file   Continue Taking    PRENATAL MULTIVIT-CA-MIN-FE-FA (PRENATAL VITAMIN) TAB    Take 1 Tab by mouth daily. Indications: Pregnancy   These Medications have changed    No medications on file   Stop Taking    No medications on file     _______________________________    Attestations:  [id number]av[address] and Paris Syed acting as a scribe for and in the presence of Aubree Merritt DO      September 24, 2018 at 7:18 PM       Provider Attestation:      I personally performed the services described in the documentation, reviewed the documentation, as recorded by the scribe in my presence, and it accurately and completely records my words and actions.  September 24, 2018 at 7:18 PM - Aubree Merritt DO    _______________________________

## 2018-09-25 NOTE — DISCHARGE INSTRUCTIONS
Abnormal Uterine Bleeding: Care Instructions  Your Care Instructions    Abnormal uterine bleeding (AUB) is irregular bleeding from the uterus that is longer or heavier than usual or does not occur at your regular time. Sometimes it is caused by changes in hormone levels. It can also be caused by growths in the uterus, such as fibroids or polyps. Sometimes a cause cannot be found. You may have heavy bleeding when you are not expecting your period. Your doctor may suggest a pregnancy test, if you think you are pregnant. Follow-up care is a key part of your treatment and safety. Be sure to make and go to all appointments, and call your doctor if you are having problems. It's also a good idea to know your test results and keep a list of the medicines you take. How can you care for yourself at home? · Be safe with medicines. Take pain medicines exactly as directed. ¨ If the doctor gave you a prescription medicine for pain, take it as prescribed. ¨ If you are not taking a prescription pain medicine, ask your doctor if you can take an over-the-counter medicine. · You may be low in iron because of blood loss. Eat a balanced diet that is high in iron and vitamin C. Foods rich in iron include red meat, shellfish, eggs, beans, and leafy green vegetables. Talk to your doctor about whether you need to take iron pills or a multivitamin. When should you call for help? Call 911 anytime you think you may need emergency care. For example, call if:    · You passed out (lost consciousness).    Call your doctor now or seek immediate medical care if:    · You have new or worse belly or pelvic pain.     · You have severe vaginal bleeding.     · You feel dizzy or lightheaded, or you feel like you may faint.    Watch closely for changes in your health, and be sure to contact your doctor if:    · You think you may be pregnant.     · Your bleeding gets worse.     · You do not get better as expected.    Where can you learn more?  Go to http://shirley-gilbert.info/. Enter N327 in the search box to learn more about \"Abnormal Uterine Bleeding: Care Instructions. \"  Current as of: October 6, 2017  Content Version: 11.7  © 2627-1301 [employer]. Care instructions adapted under license by [employer] (which disclaims liability or warranty for this information). If you have questions about a medical condition or this instruction, always ask your healthcare professional. [address] any warranty or liability for your use of this information. Lightheadedness or Faintness: Care Instructions  Your Care Instructions  Lightheadedness is a feeling that you are about to faint or \"pass out. \" You do not feel as if you or your surroundings are moving. It is different from vertigo, which is the feeling that you or things around you are spinning or tilting. Lightheadedness usually goes away or gets better when you lie down. If lightheadedness gets worse, it can lead to a fainting spell. It is common to feel lightheaded from time to time. Lightheadedness usually is not caused by a serious problem. It often is caused by a short-lasting drop in blood pressure and blood flow to your head that occurs when you get up too quickly from a seated or lying position. Follow-up care is a key part of your treatment and safety. Be sure to make and go to all appointments, and call your doctor if you are having problems. It's also a good idea to know your test results and keep a list of the medicines you take. How can you care for yourself at home? · Lie down for 1 or 2 minutes when you feel lightheaded. After lying down, sit up slowly and remain sitting for 1 to 2 minutes before slowly standing up. · Avoid movements, positions, or activities that have made you lightheaded in the past.  · Get plenty of rest, especially if you have a cold or flu, which can cause lightheadedness.   · Make sure you drink plenty of fluids, especially if you have a fever or have been sweating. · Do not drive or put yourself and others in danger while you feel lightheaded. When should you call for help? Call 911 anytime you think you may need emergency care. For example, call if:    · You have symptoms of a stroke. These may include:  ¨ Sudden numbness, tingling, weakness, or loss of movement in your face, arm, or leg, especially on only one side of your body. ¨ Sudden vision changes. ¨ Sudden trouble speaking. ¨ Sudden confusion or trouble understanding simple statements. ¨ Sudden problems with walking or balance. ¨ A sudden, severe headache that is different from past headaches.     · You have symptoms of a heart attack. These may include:  ¨ Chest pain or pressure, or a strange feeling in the chest.  ¨ Sweating. ¨ Shortness of breath. ¨ Nausea or vomiting. ¨ Pain, pressure, or a strange feeling in the back, neck, jaw, or upper belly or in one or both shoulders or arms. ¨ Lightheadedness or sudden weakness. ¨ A fast or irregular heartbeat. After you call 911, the  may tell you to chew 1 adult-strength or 2 to 4 low-dose aspirin. Wait for an ambulance. Do not try to drive yourself.    Watch closely for changes in your health, and be sure to contact your doctor if:    · Your lightheadedness gets worse or does not get better with home care. Where can you learn more? Go to http://shirley-gilbert.info/. Enter J994 in the search box to learn more about \"Lightheadedness or Faintness: Care Instructions. \"  Current as of: November 20, 2017  Content Version: 11.7  © 1290-5195 [employer]. Care instructions adapted under license by [employer] (which disclaims liability or warranty for this information).  If you have questions about a medical condition or this instruction, always ask your healthcare professional. Angus [employer] disclaims any warranty or liability for your use of this information.

## 2018-09-25 NOTE — ED NOTES
Reviewed discharge instructions including suggested follow-uip. Questions encouraged and answered.   Patient verbalized an understanding

## 2019-09-16 ENCOUNTER — HOSPITAL ENCOUNTER (EMERGENCY)
Age: 24
Discharge: HOME | End: 2019-09-16
Attending: EMERGENCY MEDICINE
Payer: COMMERCIAL

## 2019-09-16 VITALS
RESPIRATION RATE: 16 BRPM | OXYGEN SATURATION: 100 % | TEMPERATURE: 97.9 F | DIASTOLIC BLOOD PRESSURE: 74 MMHG | HEART RATE: 79 BPM | SYSTOLIC BLOOD PRESSURE: 112 MMHG

## 2019-09-16 DIAGNOSIS — J06.9 ACUTE UPPER RESPIRATORY INFECTION: Primary | ICD-10-CM

## 2019-09-16 LAB
APPEARANCE UR: NORMAL
BILIRUB UR QL: NEGATIVE
COLOR UR: YELLOW
GLUCOSE UR STRIP.AUTO-MCNC: NEGATIVE MG/DL
HCG UR QL: NEGATIVE
HGB UR QL STRIP: NEGATIVE
KETONES UR QL STRIP.AUTO: NEGATIVE MG/DL
LEUKOCYTE ESTERASE UR QL STRIP.AUTO: NEGATIVE
NITRITE UR QL STRIP.AUTO: NEGATIVE
PH UR STRIP: 7 [PH] (ref 5–8)
PROT UR STRIP-MCNC: NEGATIVE MG/DL
SP GR UR REFRACTOMETRY: 1.02 (ref 1–1.03)
UROBILINOGEN UR QL STRIP.AUTO: 0.2 EU/DL (ref 0.2–1)

## 2019-09-16 PROCEDURE — 81025 URINE PREGNANCY TEST: CPT

## 2019-09-16 PROCEDURE — 74011250637 HC RX REV CODE- 250/637: Performed by: PHYSICIAN ASSISTANT

## 2019-09-16 PROCEDURE — 81003 URINALYSIS AUTO W/O SCOPE: CPT

## 2019-09-16 PROCEDURE — 99282 EMERGENCY DEPT VISIT SF MDM: CPT

## 2019-09-16 RX ORDER — ACETAMINOPHEN 500 MG
1000 TABLET ORAL
Status: COMPLETED | OUTPATIENT
Start: 2019-09-16 | End: 2019-09-16

## 2019-09-16 RX ORDER — ONDANSETRON 4 MG/1
4 TABLET, ORALLY DISINTEGRATING ORAL
Status: COMPLETED | OUTPATIENT
Start: 2019-09-16 | End: 2019-09-16

## 2019-09-16 RX ORDER — BENZONATATE 100 MG/1
100 CAPSULE ORAL
Qty: 21 CAP | Refills: 0 | Status: SHIPPED | OUTPATIENT
Start: 2019-09-16 | End: 2019-09-23

## 2019-09-16 RX ORDER — FLUTICASONE PROPIONATE 50 MCG
2 SPRAY, SUSPENSION (ML) NASAL DAILY
Qty: 1 BOTTLE | Refills: 0 | Status: SHIPPED | OUTPATIENT
Start: 2019-09-16

## 2019-09-16 RX ADMIN — ACETAMINOPHEN 1000 MG: 500 TABLET, FILM COATED ORAL at 13:45

## 2019-09-16 RX ADMIN — ONDANSETRON 4 MG: 4 TABLET, ORALLY DISINTEGRATING ORAL at 13:45

## 2019-09-16 NOTE — ED PROVIDER NOTES
EMERGENCY DEPARTMENT HISTORY AND PHYSICAL EXAM    1:39 PM      Date: 9/16/2019  Patient Name: Annie Mcdonough    History of Presenting Illness     Chief Complaint   Patient presents with    Sore Throat         History Provided By: Patient    Additional History (Context): Annie Mcdonough is a 22 y.o. female with hx of migraines who presents with c/o rhinorrhea, sinus congestion, sore throat, cough, and body aches x 2 days. Notes she has tried Mucinex without relief. Denies sick contacts. Denies fever/chills, chest pain, dyspnea, n/v, neck pain. LMP: 8/8/19    PCP: Holden Mccain MD    Current Outpatient Medications   Medication Sig Dispense Refill    fluticasone propionate (FLONASE) 50 mcg/actuation nasal spray 2 Sprays by Both Nostrils route daily. 1 Bottle 0    benzonatate (TESSALON PERLES) 100 mg capsule Take 1 Cap by mouth three (3) times daily as needed for Cough for up to 7 days. 21 Cap 0    prenatal multivit-ca-min-fe-fa (PRENATAL VITAMIN) tab Take 1 Tab by mouth daily. Indications: Pregnancy 90 Tab 1       Past History     Past Medical History:  Past Medical History:   Diagnosis Date    Migraines     Migraines        Past Surgical History:  History reviewed. No pertinent surgical history. Family History:  History reviewed. No pertinent family history. Social History:  Social History     Tobacco Use    Smoking status: Never Smoker   Substance Use Topics    Alcohol use: No    Drug use: No       Allergies: Allergies   Allergen Reactions    Mushroom Combination No.1 Swelling         Review of Systems       Review of Systems   Constitutional: Negative for chills and fever. HENT: Positive for congestion, rhinorrhea, sinus pressure and sore throat. Respiratory: Positive for cough. Negative for shortness of breath. Cardiovascular: Negative for chest pain. Gastrointestinal: Negative for abdominal pain, nausea and vomiting. Musculoskeletal: Positive for myalgias. Skin: Negative for rash. Neurological: Negative for weakness. All other systems reviewed and are negative. Physical Exam     Visit Vitals  /74   Pulse 79   Temp 97.9 °F (36.6 °C)   Resp 16   SpO2 100%         Physical Exam   Constitutional: She appears well-developed and well-nourished. No distress. HENT:   Head: Normocephalic and atraumatic. Right Ear: External ear normal.   Left Ear: External ear normal.   Nose: Mucosal edema and rhinorrhea present. Right sinus exhibits no maxillary sinus tenderness and no frontal sinus tenderness. Left sinus exhibits no maxillary sinus tenderness and no frontal sinus tenderness. Mouth/Throat: Oropharynx is clear and moist. No oropharyngeal exudate. Eyes: Pupils are equal, round, and reactive to light. Right eye exhibits no discharge. Neck: Normal range of motion. Neck supple. No nuchal rigidity    Cardiovascular: Normal rate, regular rhythm, normal heart sounds and intact distal pulses. Exam reveals no gallop and no friction rub. No murmur heard. Pulmonary/Chest: Effort normal and breath sounds normal. No stridor. No respiratory distress. She has no wheezes. She has no rales. Musculoskeletal: Normal range of motion. Lymphadenopathy:     She has no cervical adenopathy. Neurological: She is alert. Skin: Skin is warm. No rash noted. She is not diaphoretic. Nursing note and vitals reviewed.         Diagnostic Study Results     Labs -  Recent Results (from the past 12 hour(s))   URINALYSIS W/ RFLX MICROSCOPIC    Collection Time: 09/16/19  1:22 PM   Result Value Ref Range    Color YELLOW      Appearance CLOUDY      Specific gravity 1.018 1.005 - 1.030      pH (UA) 7.0 5.0 - 8.0      Protein NEGATIVE  NEG mg/dL    Glucose NEGATIVE  NEG mg/dL    Ketone NEGATIVE  NEG mg/dL    Bilirubin NEGATIVE  NEG      Blood NEGATIVE  NEG      Urobilinogen 0.2 0.2 - 1.0 EU/dL    Nitrites NEGATIVE  NEG      Leukocyte Esterase NEGATIVE  NEG     HCG URINE, QL    Collection Time: 09/16/19 1:22 PM   Result Value Ref Range    HCG urine, QL NEGATIVE  NEG         Radiologic Studies -   No orders to display         Medical Decision Making   I am the first provider for this patient. I reviewed the vital signs, available nursing notes, past medical history, past surgical history, family history and social history. Vital Signs-Reviewed the patient's vital signs. Records Reviewed: Nursing Notes and Old Medical Records (Time of Review: 1:39 PM)    ED Course: Progress Notes, Reevaluation, and Consults:  2:39 PM  Reviewed results with patient. Discussed need for close outpatient follow-up this week for reassessment. Discussed strict return precautions, including fever, vomiting, or any other medical concerns. Provider Notes (Medical Decision Making): 60-year-old female who presents due to rhinorrhea, sore throat, and body aches. Afebrile, nontoxic-appearing, looks well. No evidence of otitis media/externa, strep pharyngitis. Lungs clear to auscultation bilaterally. No nuchal rigidity or rash. Signs and symptoms consistent with viral syndrome. Stable for discharge with symptomatic management and close outpatient follow-up    Diagnosis     Clinical Impression:   1. Acute upper respiratory infection        Disposition: home     Follow-up Information     Follow up With Specialties Details Why [facility] EMERGENCY DEPT Emergency Medicine  If symptoms worsen [address]  [phone]    Jv Pleitez MD Internal Medicine In 2 days  [address] [address]  [phone]             Patient's Medications   Start Taking    BENZONATATE (TESSALON PERLES) 100 MG CAPSULE    Take 1 Cap by mouth three (3) times daily as needed for Cough for up to 7 days. FLUTICASONE PROPIONATE (FLONASE) 50 MCG/ACTUATION NASAL SPRAY    2 Sprays by Both Nostrils route daily.    Continue Taking    PRENATAL MULTIVIT-CA-MIN-FE-FA (PRENATAL VITAMIN) TAB    Take 1 Tab by mouth daily. Indications: Pregnancy   These Medications have changed    No medications on file   Stop Taking    No medications on file       Dictation disclaimer:  Please note that this dictation was completed with [employer], the computer voice recognition software. Quite often unanticipated grammatical, syntax, homophones, and other interpretive errors are inadvertently transcribed by the computer software. Please disregard these errors. Please excuse any errors that have escaped final proofreading.

## 2019-09-16 NOTE — LETTER
[facility] EMERGENCY DEPT 
[address] 
[address] [facility] [address] [phone] Work/School Note Date: 9/16/2019 To Whom It May concern: 
 
Sandra Gomez was seen and treated today in the emergency room by the following provider(s): 
Attending Provider: Gerald Hinton MD 
Physician Assistant: WILLOW Martinez. Sandra Gomez may return to work on 9/18/19.  
 
Sincerely, 
 
 
 
 
WILLOW Otero

## 2019-09-16 NOTE — DISCHARGE INSTRUCTIONS
Patient Education      Take medication as prescribed. Follow-up with your primary care physician within 2 days for reassessment. Bring the results from this visit with you for their review. Return to the ED immediately for any new, worsening, or persistent symptoms, including fever, vomiting, or any other medical concerns. Upper Respiratory Infection (Cold): Care Instructions  Your Care Instructions    An upper respiratory infection, or URI, is an infection of the nose, sinuses, or throat. URIs are spread by coughs, sneezes, and direct contact. The common cold is the most frequent kind of URI. The flu and sinus infections are other kinds of URIs. Almost all URIs are caused by viruses. Antibiotics won't cure them. But you can treat most infections with home care. This may include drinking lots of fluids and taking over-the-counter pain medicine. You will probably feel better in 4 to 10 days. The doctor has checked you carefully, but problems can develop later. If you notice any problems or new symptoms, get medical treatment right away. Follow-up care is a key part of your treatment and safety. Be sure to make and go to all appointments, and call your doctor if you are having problems. It's also a good idea to know your test results and keep a list of the medicines you take. How can you care for yourself at home? · To prevent dehydration, drink plenty of fluids, enough so that your urine is light yellow or clear like water. Choose water and other caffeine-free clear liquids until you feel better. If you have kidney, heart, or liver disease and have to limit fluids, talk with your doctor before you increase the amount of fluids you drink. · Take an over-the-counter pain medicine, such as acetaminophen (Tylenol), ibuprofen (Advil, Motrin), or naproxen (Aleve). Read and follow all instructions on the label. · Before you use cough and cold medicines, check the label.  These medicines may not be safe for young children or for people with certain health problems. · Be careful when taking over-the-counter cold or flu medicines and Tylenol at the same time. Many of these medicines have acetaminophen, which is Tylenol. Read the labels to make sure that you are not taking more than the recommended dose. Too much acetaminophen (Tylenol) can be harmful. · Get plenty of rest.  · Do not smoke or allow others to smoke around you. If you need help quitting, talk to your doctor about stop-smoking programs and medicines. These can increase your chances of quitting for good. When should you call for help? Call 911 anytime you think you may need emergency care. For example, call if:    · You have severe trouble breathing.    Call your doctor now or seek immediate medical care if:    · You seem to be getting much sicker.     · You have new or worse trouble breathing.     · You have a new or higher fever.     · You have a new rash.    Watch closely for changes in your health, and be sure to contact your doctor if:    · You have a new symptom, such as a sore throat, an earache, or sinus pain.     · You cough more deeply or more often, especially if you notice more mucus or a change in the color of your mucus.     · You do not get better as expected. Where can you learn more? Go to http://shirley-gilbert.info/. Enter S287 in the search box to learn more about \"Upper Respiratory Infection (Cold): Care Instructions. \"  Current as of: September 5, 2018  Content Version: 12.1  © 6863-8770 Healthwise, Incorporated. Care instructions adapted under license by [employer] (which disclaims liability or warranty for this information). If you have questions about a medical condition or this instruction, always ask your healthcare professional. [place] [address] any warranty or liability for your use of this information.

## 2019-09-16 NOTE — ED NOTES
Discharge instructions given to patient by WILLOW Dong, patient verbalizes understanding of instructions. Patient alert and oriented x3, denies pain or shortness of breath at this time. Patient ambulatory, gait steady. Patient armband removed and given to patient to take home.   Patient was informed of the privacy risks if armband lost or stolen

## 2019-09-16 NOTE — ED TRIAGE NOTES
\"I thought I just had a sinus infection but now I have a sore throat and my body aches and congestion. Everything just started a few hours ago. \"

## 2021-01-28 ENCOUNTER — HOSPITAL ENCOUNTER (EMERGENCY)
Age: 26
Discharge: HOME | End: 2021-01-28
Attending: EMERGENCY MEDICINE
Payer: COMMERCIAL

## 2021-01-28 VITALS
WEIGHT: 202 LBS | HEART RATE: 74 BPM | DIASTOLIC BLOOD PRESSURE: 65 MMHG | OXYGEN SATURATION: 100 % | TEMPERATURE: 98.5 F | SYSTOLIC BLOOD PRESSURE: 112 MMHG | BODY MASS INDEX: 35.78 KG/M2 | RESPIRATION RATE: 17 BRPM

## 2021-01-28 DIAGNOSIS — O20.9 VAGINAL BLEEDING IN PREGNANCY, FIRST TRIMESTER: Primary | ICD-10-CM

## 2021-01-28 LAB
APPEARANCE UR: CLEAR
BACTERIA URNS QL MICRO: NEGATIVE /HPF
BILIRUB UR QL: NEGATIVE
COLOR UR: YELLOW
EPITH CASTS URNS QL MICRO: NORMAL /LPF (ref 0–5)
GLUCOSE UR STRIP.AUTO-MCNC: NEGATIVE MG/DL
HGB UR QL STRIP: ABNORMAL
KETONES UR QL STRIP.AUTO: NEGATIVE MG/DL
LEUKOCYTE ESTERASE UR QL STRIP.AUTO: NEGATIVE
NITRITE UR QL STRIP.AUTO: NEGATIVE
PH UR STRIP: 7 [PH] (ref 5–8)
PROT UR STRIP-MCNC: NEGATIVE MG/DL
RBC #/AREA URNS HPF: NORMAL /HPF (ref 0–5)
SERVICE CMNT-IMP: NORMAL
SP GR UR REFRACTOMETRY: 1.01 (ref 1–1.03)
UROBILINOGEN UR QL STRIP.AUTO: 0.2 EU/DL (ref 0.2–1)
WBC URNS QL MICRO: NEGATIVE /HPF (ref 0–4)
WET PREP GENITAL: NORMAL

## 2021-01-28 PROCEDURE — 81001 URINALYSIS AUTO W/SCOPE: CPT

## 2021-01-28 PROCEDURE — 87491 CHLMYD TRACH DNA AMP PROBE: CPT

## 2021-01-28 PROCEDURE — 99284 EMERGENCY DEPT VISIT MOD MDM: CPT

## 2021-01-28 PROCEDURE — 87210 SMEAR WET MOUNT SALINE/INK: CPT

## 2021-01-28 NOTE — ED PROVIDER NOTES
Patient is a 71-year-old woman who is a G2, P1 at approximately 11 weeks gestation who presents to the ED today with vaginal bleeding. She states that she has unsure of the amount of blood. She states that she had intercourse earlier and then went to the bathroom and she had bleeding. Afterwards, she wear a pad here and it was empty but then she wiped and she had some dark blood. She denies any other vaginal discharge. She denies any pelvic cramping. She denies any dysuria. She does state that her blood type is O+. Past Medical History:   Diagnosis Date    Migraines     Migraines        No past surgical history on file. History reviewed. No pertinent family history.     Social History     Socioeconomic History    Marital status: SINGLE     Spouse name: Not on file    Number of children: Not on file    Years of education: Not on file    Highest education level: Not on file   Occupational History    Not on file   Social Needs    Financial resource strain: Not on file    Food insecurity     Worry: Not on file     Inability: Not on file    Transportation needs     Medical: Not on file     Non-medical: Not on file   Tobacco Use    Smoking status: Never Smoker   Substance and Sexual Activity    Alcohol use: No    Drug use: No    Sexual activity: Not on file   Lifestyle    Physical activity     Days per week: Not on file     Minutes per session: Not on file    Stress: Not on file   Relationships    Social connections     Talks on phone: Not on file     Gets together: Not on file     Attends Latter-day service: Not on file     Active member of club or organization: Not on file     Attends meetings of clubs or organizations: Not on file     Relationship status: Not on file    Intimate partner violence     Fear of current or ex partner: Not on file     Emotionally abused: Not on file     Physically abused: Not on file     Forced sexual activity: Not on file   Other Topics Concern  Not on file   Social History Narrative    Not on file         ALLERGIES: Mushroom combination no.1    Review of Systems   All other systems reviewed and are negative. Vitals:    01/28/21 0044   BP: 118/82   Pulse: 87   Resp: 16   Temp: 98.6 °F (37 °C)   SpO2: 98%   Weight: 91.6 kg (202 lb)            Physical Exam  Vitals signs and nursing note reviewed. Constitutional:       Appearance: Normal appearance. Comments: Very tearful   HENT:      Head: Normocephalic and atraumatic. Right Ear: External ear normal.      Left Ear: External ear normal.      Nose: Nose normal.      Mouth/Throat:      Mouth: Mucous membranes are moist.      Pharynx: Oropharynx is clear. Eyes:      Extraocular Movements: Extraocular movements intact. Conjunctiva/sclera: Conjunctivae normal.      Pupils: Pupils are equal, round, and reactive to light. Neck:      Musculoskeletal: Normal range of motion and neck supple. Cardiovascular:      Rate and Rhythm: Normal rate and regular rhythm. Pulses: Normal pulses. Heart sounds: Normal heart sounds. Pulmonary:      Effort: Pulmonary effort is normal.      Breath sounds: Normal breath sounds. Abdominal:      General: Abdomen is flat. Bowel sounds are normal.      Palpations: Abdomen is soft. Genitourinary:     General: Normal vulva. Vagina: Bleeding present. Cervix: Cervical bleeding present. Uterus: Normal.       Adnexa: Left adnexa normal.      Comments: Cervical os is closed  Musculoskeletal: Normal range of motion. Skin:     General: Skin is warm and dry. Capillary Refill: Capillary refill takes less than 2 seconds. Neurological:      General: No focal deficit present. Mental Status: She is alert and oriented to person, place, and time. Psychiatric:         Behavior: Behavior normal.         Thought Content:  Thought content normal.         Judgment: Judgment normal.      Comments: Tearful and appropriately concerned Recent Results (from the past 12 hour(s))   URINALYSIS W/ RFLX MICROSCOPIC    Collection Time: 01/28/21  3:11 AM   Result Value Ref Range    Color YELLOW      Appearance CLEAR      Specific gravity 1.011 1.005 - 1.030      pH (UA) 7.0 5.0 - 8.0      Protein Negative NEG mg/dL    Glucose Negative NEG mg/dL    Ketone Negative NEG mg/dL    Bilirubin Negative NEG      Blood TRACE (A) NEG      Urobilinogen 0.2 0.2 - 1.0 EU/dL    Nitrites Negative NEG      Leukocyte Esterase Negative NEG     URINE MICROSCOPIC ONLY    Collection Time: 01/28/21  3:11 AM   Result Value Ref Range    WBC Negative 0 - 4 /hpf    RBC 0 to 3 0 - 5 /hpf    Epithelial cells FEW 0 - 5 /lpf    Bacteria Negative NEG /hpf   WET PREP    Collection Time: 01/28/21  3:50 AM    Specimen: Vagina   Result Value Ref Range    Special Requests: NO SPECIAL REQUESTS      Wet prep NO YEAST,TRICHOMONAS OR CLUE CELLS NOTED           MDM  Number of Diagnoses or Management Options  Diagnosis management comments: The patient is a G2, P1 at 11 weeks gestation with vaginal bleeding after intercourse. Her blood type is O+. I did do a bedside ultrasound and there is positive fetal cardiac activity and fetal movement. On exam, her os is closed. Her urine does not show any signs of infection. Her wet prep is negative. Her GC and Chlamydia are pending. She will be discharged home and advised to follow-up with her regular OB/GYN within the next 2 to 3 days. Return precautions have been given. Bedside US    Date/Time: 1/28/2021 2:56 AM  Performed by: Nida Yepez MD  Authorized by: Nida Yepez MD     Written consent obtained: Yes    Given by:  Patient  Performed by: Attending  Type of procedure:   Focused pelvic ultrasound obstetrical  Indications:  Vaginal bleeding  Transabdominal sagittal:  Adequate  Transabdominal transverse:  Adequate  Intrauterine Pregnancy:  Present  Fetal heart    Fetal motion    Interpretation:  Live intrauterine pregnancy  Transabdominal sagittal:  Adequate  Transabdominal transverse:  Adequate    Confirmation study:  I have advised the patient to obtain a confirmatory study as an outpatient.

## 2021-01-28 NOTE — ED TRIAGE NOTES
11 wk ob  c/o vaginal bleeding during sex 20min pta. Pregnancy confirmed via ultrasound with her ob.   Denies abd cramping or any other symptom

## 2021-01-28 NOTE — ED NOTES
Pt assessment upon discharge was stable  Pt discharged home  Education was completed   Verbal instruction was given   Pt understood and read back   Pt discharged with self  Pt received paperwork about discharge instructions   Released care of pt

## 2021-01-28 NOTE — ED NOTES
Pt asked to use the bathroom   Pt sat at the bedside   Denied SOB   Denied felling light headed   Ambulated with strong steady gate   This RN stood by   Ensured privacy   Pt using bathroom   Will continue to monitor and assess

## 2021-01-28 NOTE — ED NOTES
Assumed care of pt  Pt ambulated from triage with strong steady gait  Introduced self to pt  Pt alert and oriented x 4  C/o vaginal bleeding after intercourse   Pt is 11 weeks pregnant    SA 0 L1  Was seen at [facility] [place] for ultra sound to confirm pregnancy   -cramping  -large clots  -saturated thru pads   -rupture of membranes   Updated about plan of care   Will continue to monitor and assess

## 2021-01-29 LAB
C TRACH RRNA SPEC QL NAA+PROBE: NEGATIVE
N GONORRHOEA RRNA SPEC QL NAA+PROBE: NEGATIVE
SPECIMEN SOURCE: NORMAL

## 2025-06-25 NOTE — DISCHARGE INSTRUCTIONS
[employer] Activation    Thank you for requesting access to [employer]. Please follow the instructions below to securely access and download your online medical record. [employer] allows you to send messages to your doctor, view your test results, renew your prescriptions, schedule appointments, and more. How Do I Sign Up? 1. In your internet browser, go to [email or web address]  2. Click on the First Time User? Click Here link in the Sign In box. You will be redirect to the New Member Sign Up page. 3. Enter your [employer] Access Code exactly as it appears below. You will not need to use this code after youve completed the sign-up process. If you do not sign up before the expiration date, you must request a new code. [employer] Access Code: Activation code not generated  Current [employer] Status: Active (This is the date your or[employer]ics access code will )    4. Enter the last four digits of your Social Security Number (xxxx) and Date of Birth (mm/dd/yyyy) as indicated and click Submit. You will be taken to the next sign-up page. 5. Create a [employer]ics ID. This will be your or[employer]ics login ID and cannot be changed, so think of one that is secure and easy to remember. 6. Create a [employer] password. You can change your password at any time. 7. Enter your Password Reset Question and Answer. This can be used at a later time if you forget your password. 8. Enter your e-mail address. You will receive e-mail notification when new information is available in [address] 9. Click Sign Up. You can now view and download portions of your medical record. 10. Click the Download Summary menu link to download a portable copy of your medical information. Additional Information    If you have questions, please visit the Frequently Asked Questions section of the [employer]ics website at [email or web address] Remember, [employer]ics is NOT to be used for urgent needs. For medical emergencies, dial 911. pt seen bedside seated upright on 5LNC, alert and oriented x3 with periods of confusion. pt responded to questions for assessment with good accuracy, verbalized her wants/needs and followed simple 1 step directives.     Swallow history. Per charting, MBS completed at [facility] 7/02/21 with recommendations for dysphagia 1 diet with moderately thickened liquids. Bedside swallow evaluation completed at [facility] 1/24/24 with recommendations for puree/thin liquids, and at [facility] 7/01/21 with recommendations for NPO.    CXR 6/24/25 FINDINGS/IMPRESSION: Heart size, mediastinal and hilar contours are unchanged and within normal limits for the patient's stated age. Of note are stable prominent pulmonary arteries and pulmonary hypertension cannot be excluded. There has been improvement in aeration of the left lower lung zone with multiple bibasilar linear opacities compatible with probable subsegmental atelectasis. There may be patchy opacities within the right lower lung zone unchanged. A right lower lobe pneumonia cannot be excluded. No large effusions or pneumothorax. Marked degenerative change of the visualized axial skeleton.